# Patient Record
Sex: MALE | Race: BLACK OR AFRICAN AMERICAN | NOT HISPANIC OR LATINO | Employment: FULL TIME | URBAN - METROPOLITAN AREA
[De-identification: names, ages, dates, MRNs, and addresses within clinical notes are randomized per-mention and may not be internally consistent; named-entity substitution may affect disease eponyms.]

---

## 2023-04-03 PROBLEM — M25.562 CHRONIC PAIN OF LEFT KNEE: Status: ACTIVE | Noted: 2023-04-03

## 2023-04-03 PROBLEM — G47.33 OBSTRUCTIVE SLEEP APNEA: Status: ACTIVE | Noted: 2023-04-03

## 2023-04-03 PROBLEM — R09.81 NASAL CONGESTION: Status: ACTIVE | Noted: 2023-04-03

## 2023-04-03 PROBLEM — E66.812 CLASS 2 OBESITY WITH BODY MASS INDEX (BMI) OF 37.0 TO 37.9 IN ADULT: Status: ACTIVE | Noted: 2023-04-03

## 2023-04-03 PROBLEM — M54.41 CHRONIC MIDLINE LOW BACK PAIN WITH RIGHT-SIDED SCIATICA: Status: ACTIVE | Noted: 2023-04-03

## 2023-04-03 PROBLEM — E11.9 DIABETES MELLITUS (MULTI): Status: ACTIVE | Noted: 2023-04-03

## 2023-04-03 PROBLEM — G89.29 CHRONIC MIDLINE LOW BACK PAIN WITH RIGHT-SIDED SCIATICA: Status: ACTIVE | Noted: 2023-04-03

## 2023-04-03 PROBLEM — E66.01 MORBID OBESITY (MULTI): Status: ACTIVE | Noted: 2023-04-03

## 2023-04-03 PROBLEM — M79.642 LEFT HAND PAIN: Status: ACTIVE | Noted: 2023-04-03

## 2023-04-03 PROBLEM — M16.11 ARTHRITIS OF RIGHT HIP: Status: ACTIVE | Noted: 2023-04-03

## 2023-04-03 PROBLEM — N18.30 CKD (CHRONIC KIDNEY DISEASE), STAGE III (MULTI): Status: ACTIVE | Noted: 2023-04-03

## 2023-04-03 PROBLEM — L60.3 NAIL DYSTROPHY: Status: ACTIVE | Noted: 2023-04-03

## 2023-04-03 PROBLEM — M99.9 NONALLOPATHIC LESION OF LUMBAR REGION: Status: ACTIVE | Noted: 2023-04-03

## 2023-04-03 PROBLEM — E78.5 HYPERLIPIDEMIA: Status: ACTIVE | Noted: 2023-04-03

## 2023-04-03 PROBLEM — E66.9 CLASS 2 OBESITY WITH BODY MASS INDEX (BMI) OF 37.0 TO 37.9 IN ADULT: Status: ACTIVE | Noted: 2023-04-03

## 2023-04-03 PROBLEM — R46.89 NON-COMPLIANT BEHAVIOR: Status: ACTIVE | Noted: 2023-04-03

## 2023-04-03 PROBLEM — R06.00 DYSPNEA: Status: ACTIVE | Noted: 2023-04-03

## 2023-04-03 PROBLEM — R07.9 CHEST PAIN: Status: ACTIVE | Noted: 2023-04-03

## 2023-04-03 PROBLEM — R51.9 ACHING HEADACHE: Status: ACTIVE | Noted: 2023-04-03

## 2023-04-03 PROBLEM — E04.9 GOITER: Status: ACTIVE | Noted: 2023-04-03

## 2023-04-03 PROBLEM — E29.1 HYPOGONADISM MALE: Status: ACTIVE | Noted: 2023-04-03

## 2023-04-03 PROBLEM — G56.00 CARPAL TUNNEL SYNDROME: Status: ACTIVE | Noted: 2023-04-03

## 2023-04-03 PROBLEM — E11.9 DIABETES MELLITUS, TYPE 2 (MULTI): Status: ACTIVE | Noted: 2023-04-03

## 2023-04-03 PROBLEM — M25.559 JOINT PAIN, HIP: Status: ACTIVE | Noted: 2023-04-03

## 2023-04-03 PROBLEM — M99.06 SOMATIC DYSFUNCTION OF LOWER EXTREMITIES: Status: ACTIVE | Noted: 2023-04-03

## 2023-04-03 PROBLEM — D12.6 TUBULAR ADENOMA OF COLON: Status: ACTIVE | Noted: 2023-04-03

## 2023-04-03 PROBLEM — M16.10 HIP ARTHRITIS: Status: ACTIVE | Noted: 2023-04-03

## 2023-04-03 PROBLEM — M79.89 SOFT TISSUE MASS: Status: ACTIVE | Noted: 2023-04-03

## 2023-04-03 PROBLEM — R80.9 PROTEINURIA: Status: ACTIVE | Noted: 2023-04-03

## 2023-04-03 PROBLEM — S83.412A SPRAIN OF MEDIAL COLLATERAL LIGAMENT OF LEFT KNEE: Status: ACTIVE | Noted: 2023-04-03

## 2023-04-03 PROBLEM — I10 BENIGN ESSENTIAL HYPERTENSION: Status: ACTIVE | Noted: 2023-04-03

## 2023-04-03 PROBLEM — G89.29 CHRONIC PAIN OF LEFT KNEE: Status: ACTIVE | Noted: 2023-04-03

## 2023-04-03 RX ORDER — ACETAMINOPHEN 500 MG
1-2 TABLET ORAL AS NEEDED
COMMUNITY
Start: 2021-06-21

## 2023-04-03 RX ORDER — METFORMIN HYDROCHLORIDE 500 MG/1
2 TABLET, EXTENDED RELEASE ORAL 2 TIMES DAILY
COMMUNITY
Start: 2017-02-20 | End: 2023-10-25

## 2023-04-03 RX ORDER — LISINOPRIL 40 MG/1
1 TABLET ORAL DAILY
COMMUNITY
Start: 2013-08-08 | End: 2023-04-06

## 2023-04-03 RX ORDER — GLYBURIDE 5 MG/1
2 TABLET ORAL 2 TIMES DAILY
COMMUNITY
Start: 2015-05-06 | End: 2023-07-06

## 2023-04-03 RX ORDER — BLOOD SUGAR DIAGNOSTIC
1 STRIP MISCELLANEOUS 2 TIMES DAILY
COMMUNITY
Start: 2017-02-20 | End: 2024-01-30 | Stop reason: RX

## 2023-04-03 RX ORDER — ATORVASTATIN CALCIUM 40 MG/1
1 TABLET, FILM COATED ORAL DAILY
COMMUNITY
Start: 2014-06-03 | End: 2024-03-13 | Stop reason: SDUPTHER

## 2023-04-03 RX ORDER — DULAGLUTIDE 3 MG/.5ML
INJECTION, SOLUTION SUBCUTANEOUS
COMMUNITY
Start: 2018-01-24 | End: 2023-04-04 | Stop reason: SDUPTHER

## 2023-04-03 RX ORDER — HYDROCHLOROTHIAZIDE 25 MG/1
1 TABLET ORAL DAILY
COMMUNITY
Start: 2021-11-15 | End: 2024-03-13 | Stop reason: SDUPTHER

## 2023-04-04 ENCOUNTER — OFFICE VISIT (OUTPATIENT)
Dept: PRIMARY CARE | Facility: CLINIC | Age: 58
End: 2023-04-04
Payer: COMMERCIAL

## 2023-04-04 VITALS
HEART RATE: 70 BPM | OXYGEN SATURATION: 92 % | SYSTOLIC BLOOD PRESSURE: 137 MMHG | HEIGHT: 66 IN | WEIGHT: 248.2 LBS | TEMPERATURE: 97 F | BODY MASS INDEX: 39.89 KG/M2 | DIASTOLIC BLOOD PRESSURE: 76 MMHG

## 2023-04-04 DIAGNOSIS — E11.9 TYPE 2 DIABETES MELLITUS WITHOUT COMPLICATION, UNSPECIFIED WHETHER LONG TERM INSULIN USE (MULTI): ICD-10-CM

## 2023-04-04 DIAGNOSIS — M54.12 CERVICAL RADICULOPATHY: Primary | ICD-10-CM

## 2023-04-04 PROCEDURE — 3078F DIAST BP <80 MM HG: CPT | Performed by: INTERNAL MEDICINE

## 2023-04-04 PROCEDURE — 99214 OFFICE O/P EST MOD 30 MIN: CPT | Performed by: INTERNAL MEDICINE

## 2023-04-04 PROCEDURE — 4010F ACE/ARB THERAPY RXD/TAKEN: CPT | Performed by: INTERNAL MEDICINE

## 2023-04-04 PROCEDURE — 3052F HG A1C>EQUAL 8.0%<EQUAL 9.0%: CPT | Performed by: INTERNAL MEDICINE

## 2023-04-04 PROCEDURE — 3075F SYST BP GE 130 - 139MM HG: CPT | Performed by: INTERNAL MEDICINE

## 2023-04-04 RX ORDER — ASPIRIN 81 MG/1
TABLET ORAL
COMMUNITY
Start: 2021-12-30

## 2023-04-04 RX ORDER — DULAGLUTIDE 1.5 MG/.5ML
INJECTION, SOLUTION SUBCUTANEOUS
COMMUNITY
Start: 2021-12-13 | End: 2023-04-04 | Stop reason: DRUGHIGH

## 2023-04-04 RX ORDER — PANTOPRAZOLE SODIUM 40 MG/1
TABLET, DELAYED RELEASE ORAL
COMMUNITY
Start: 2021-12-30 | End: 2023-04-04 | Stop reason: ALTCHOICE

## 2023-04-04 RX ORDER — DULAGLUTIDE 3 MG/.5ML
0.5 INJECTION, SOLUTION SUBCUTANEOUS
Qty: 4 ML | Refills: 3 | Status: SHIPPED | OUTPATIENT
Start: 2023-04-04 | End: 2024-01-18 | Stop reason: SDUPTHER

## 2023-04-04 RX ORDER — DULAGLUTIDE 1.5 MG/.5ML
INJECTION, SOLUTION SUBCUTANEOUS
Qty: 4 EACH | Refills: 2 | Status: SHIPPED | OUTPATIENT
Start: 2023-04-04 | End: 2023-04-04 | Stop reason: DRUGHIGH

## 2023-04-04 ASSESSMENT — ENCOUNTER SYMPTOMS
EYES NEGATIVE: 1
CONSTITUTIONAL NEGATIVE: 1
DIZZINESS: 0
HEADACHES: 0
GASTROINTESTINAL NEGATIVE: 1
NUMBNESS: 0
CHEST TIGHTNESS: 0
SHORTNESS OF BREATH: 0
PSYCHIATRIC NEGATIVE: 1
JOINT SWELLING: 0
COUGH: 0
WHEEZING: 0

## 2023-04-04 ASSESSMENT — PATIENT HEALTH QUESTIONNAIRE - PHQ9
SUM OF ALL RESPONSES TO PHQ9 QUESTIONS 1 & 2: 0
1. LITTLE INTEREST OR PLEASURE IN DOING THINGS: NOT AT ALL
2. FEELING DOWN, DEPRESSED OR HOPELESS: NOT AT ALL

## 2023-04-04 ASSESSMENT — LIFESTYLE VARIABLES
AUDIT-C TOTAL SCORE: 2
HOW OFTEN DO YOU HAVE A DRINK CONTAINING ALCOHOL: MONTHLY OR LESS
SKIP TO QUESTIONS 9-10: 0
HOW OFTEN DO YOU HAVE SIX OR MORE DRINKS ON ONE OCCASION: LESS THAN MONTHLY
HOW MANY STANDARD DRINKS CONTAINING ALCOHOL DO YOU HAVE ON A TYPICAL DAY: 1 OR 2

## 2023-04-04 NOTE — PATIENT INSTRUCTIONS
You were seen today for intermittent tingling and feeling of the left upper extremity.  Noted that your symptoms have been occurring for approximately the past 3 months without known injury.  Your examination today did not reveal the presence of any neurological deficits and you denied having any significant pain.  An order for x-rays of your neck was completed today and a referral to  medical spine was also completed for further evaluation and management.  Call or return for evaluation if your symptoms worsen or fail to improve.  It was a pleasure seeing you today.  
Previously Declined (within the last year)

## 2023-04-04 NOTE — PROGRESS NOTES
Subjective   Patient ID: Babak Forte is a 58 y.o. male who presents for Follow-up (Left arm pain/ tingling).  Left arm arm pain and tingling that began appx three months ago. He has been evaluated in the ED, primary care medicine and cardiology. He has had  CT scan and cardiology evaluation but  the left arm tingling persists. He was seen most recently by cardiology on 03/27/2023 and he was told that his symptoms are likely caused by a pinched nerve.  The patient denies chest pain or SOB.        Review of Systems   Constitutional: Negative.    HENT: Negative.     Eyes: Negative.    Respiratory:  Negative for cough, chest tightness, shortness of breath and wheezing.    Cardiovascular:  Negative for chest pain and leg swelling.   Gastrointestinal: Negative.    Musculoskeletal:  Negative for joint swelling.   Neurological:  Negative for dizziness, numbness and headaches.   Psychiatric/Behavioral: Negative.         Objective   Physical Exam  Vitals reviewed.   Constitutional:       General: He is not in acute distress.     Appearance: He is obese. He is not ill-appearing.   HENT:      Head: Normocephalic.   Cardiovascular:      Rate and Rhythm: Normal rate and regular rhythm.      Heart sounds: Normal heart sounds.   Pulmonary:      Effort: Pulmonary effort is normal.      Breath sounds: Normal breath sounds.   Abdominal:      General: Bowel sounds are normal.      Palpations: Abdomen is soft.      Tenderness: There is no abdominal tenderness.   Musculoskeletal:         General: No swelling, tenderness or deformity. Normal range of motion.      Cervical back: Normal range of motion and neck supple. No rigidity or tenderness.   Lymphadenopathy:      Cervical: No cervical adenopathy.   Skin:     General: Skin is warm and dry.   Neurological:      General: No focal deficit present.      Mental Status: He is alert and oriented to person, place, and time.      Sensory: No sensory deficit.      Motor: No weakness.       Gait: Gait normal.   Psychiatric:         Mood and Affect: Mood normal.         Behavior: Behavior normal.         Assessment/Plan   Problem List Items Addressed This Visit          Endocrine/Metabolic    Diabetes mellitus, type 2 (CMS/HCC)    Relevant Medications    dulaglutide (Trulicity) 1.5 mg/0.5 mL pen injector     Other Visit Diagnoses       Cervical radiculopathy    -  Primary    Relevant Orders    XR cervical spine 2-3 views    Referral to Medical Spine    Follow Up In [facility] - PCP

## 2023-04-05 DIAGNOSIS — I10 BENIGN ESSENTIAL HYPERTENSION: Primary | ICD-10-CM

## 2023-04-06 RX ORDER — LISINOPRIL 40 MG/1
TABLET ORAL
Qty: 90 TABLET | Refills: 1 | Status: SHIPPED | OUTPATIENT
Start: 2023-04-06 | End: 2023-11-27

## 2023-05-12 ENCOUNTER — APPOINTMENT (OUTPATIENT)
Dept: PRIMARY CARE | Facility: CLINIC | Age: 58
End: 2023-05-12
Payer: COMMERCIAL

## 2023-07-05 DIAGNOSIS — N18.31 TYPE 2 DIABETES MELLITUS WITH STAGE 3A CHRONIC KIDNEY DISEASE, WITHOUT LONG-TERM CURRENT USE OF INSULIN (MULTI): Primary | ICD-10-CM

## 2023-07-05 DIAGNOSIS — E11.22 TYPE 2 DIABETES MELLITUS WITH STAGE 3A CHRONIC KIDNEY DISEASE, WITHOUT LONG-TERM CURRENT USE OF INSULIN (MULTI): Primary | ICD-10-CM

## 2023-07-06 RX ORDER — GLYBURIDE 5 MG/1
TABLET ORAL
Qty: 360 TABLET | Refills: 2 | Status: SHIPPED | OUTPATIENT
Start: 2023-07-06 | End: 2024-01-30 | Stop reason: ALTCHOICE

## 2023-08-02 ENCOUNTER — APPOINTMENT (OUTPATIENT)
Dept: PRIMARY CARE | Facility: CLINIC | Age: 58
End: 2023-08-02
Payer: COMMERCIAL

## 2023-08-25 ENCOUNTER — OFFICE VISIT (OUTPATIENT)
Dept: PRIMARY CARE | Facility: CLINIC | Age: 58
End: 2023-08-25
Payer: COMMERCIAL

## 2023-08-25 VITALS
BODY MASS INDEX: 38.51 KG/M2 | RESPIRATION RATE: 18 BRPM | SYSTOLIC BLOOD PRESSURE: 122 MMHG | HEART RATE: 75 BPM | OXYGEN SATURATION: 96 % | TEMPERATURE: 97 F | HEIGHT: 66 IN | DIASTOLIC BLOOD PRESSURE: 70 MMHG | WEIGHT: 239.6 LBS

## 2023-08-25 DIAGNOSIS — N18.31 TYPE 2 DIABETES MELLITUS WITH STAGE 3A CHRONIC KIDNEY DISEASE, WITHOUT LONG-TERM CURRENT USE OF INSULIN (MULTI): ICD-10-CM

## 2023-08-25 DIAGNOSIS — E78.5 HYPERLIPIDEMIA, UNSPECIFIED HYPERLIPIDEMIA TYPE: ICD-10-CM

## 2023-08-25 DIAGNOSIS — M54.12 CERVICAL RADICULOPATHY: ICD-10-CM

## 2023-08-25 DIAGNOSIS — I10 BENIGN ESSENTIAL HYPERTENSION: Primary | ICD-10-CM

## 2023-08-25 DIAGNOSIS — E11.22 TYPE 2 DIABETES MELLITUS WITH STAGE 3A CHRONIC KIDNEY DISEASE, WITHOUT LONG-TERM CURRENT USE OF INSULIN (MULTI): ICD-10-CM

## 2023-08-25 PROCEDURE — 3052F HG A1C>EQUAL 8.0%<EQUAL 9.0%: CPT | Performed by: INTERNAL MEDICINE

## 2023-08-25 PROCEDURE — 4010F ACE/ARB THERAPY RXD/TAKEN: CPT | Performed by: INTERNAL MEDICINE

## 2023-08-25 PROCEDURE — 3078F DIAST BP <80 MM HG: CPT | Performed by: INTERNAL MEDICINE

## 2023-08-25 PROCEDURE — 1036F TOBACCO NON-USER: CPT | Performed by: INTERNAL MEDICINE

## 2023-08-25 PROCEDURE — 3074F SYST BP LT 130 MM HG: CPT | Performed by: INTERNAL MEDICINE

## 2023-08-25 PROCEDURE — 99213 OFFICE O/P EST LOW 20 MIN: CPT | Performed by: INTERNAL MEDICINE

## 2023-08-25 ASSESSMENT — PATIENT HEALTH QUESTIONNAIRE - PHQ9
2. FEELING DOWN, DEPRESSED OR HOPELESS: NOT AT ALL
1. LITTLE INTEREST OR PLEASURE IN DOING THINGS: NOT AT ALL
SUM OF ALL RESPONSES TO PHQ9 QUESTIONS 1 & 2: 0

## 2023-08-25 ASSESSMENT — LIFESTYLE VARIABLES
HOW OFTEN DO YOU HAVE SIX OR MORE DRINKS ON ONE OCCASION: NEVER
AUDIT-C TOTAL SCORE: 1
HOW OFTEN DO YOU HAVE A DRINK CONTAINING ALCOHOL: MONTHLY OR LESS
HOW MANY STANDARD DRINKS CONTAINING ALCOHOL DO YOU HAVE ON A TYPICAL DAY: 1 OR 2
SKIP TO QUESTIONS 9-10: 1

## 2023-08-25 ASSESSMENT — ENCOUNTER SYMPTOMS: CONSTITUTIONAL NEGATIVE: 1

## 2023-08-25 NOTE — PATIENT INSTRUCTIONS
Assessment/Plan     Babak was seen today for diabetes.  Diagnoses and all orders for this visit:  Benign essential hypertension (Primary)  Comments:  Controlled with current treatment. BP today is at goal.  Plan: Continue current TX  Orders:  -     Basic Metabolic Panel; Future  Cervical radiculopathy  -     Follow Up In [facility] - PCP  Hyperlipidemia, unspecified hyperlipidemia type  Comments:  Most recent LDL cholesterol was at goal.   Plan: Continue Curent treatment  Orders:  -     Cholesterol, LDL Direct; Future  -     Lipid Panel Non-Fasting; Future  Type 2 diabetes mellitus with stage 3a chronic kidney disease, without long-term current use of insulin (CMS/[facility])  Comments:  Most recent Hba1c was 8.4%. goal is Hba1c<7.0%.  Plan: Chech  Hba1c today  -Continue current medications  -Continue weight reduction  Orders:  -     Basic Metabolic Panel; Future  -     Hemoglobin A1C; Future  F/U in 3 mths for DM and HTN

## 2023-08-25 NOTE — PROGRESS NOTES
Subjective   Patient ID: Babak Forte is a 58 y.o. male who presents for Diabetes.  The patient is a 57 YO male who is being seen today for DM and HTN. He states that his Diabetes control has improved over the past few months.  His most recent Hba1c  completed appx 7 1/2 mths ago was 8.4%.        Review of Systems   Constitutional: Negative.    HENT: Negative.     Eyes: Negative.    Respiratory:  Negative for cough and shortness of breath.    Cardiovascular:  Negative for chest pain and leg swelling.   Endocrine: Negative for polydipsia, polyphagia and polyuria.   Musculoskeletal:  Negative for arthralgias and joint swelling.   Neurological:  Negative for dizziness, numbness and headaches.   Psychiatric/Behavioral: Negative.         Objective   Physical Exam  Vitals reviewed.   Constitutional:       Appearance: Normal appearance.   Cardiovascular:      Rate and Rhythm: Normal rate and regular rhythm.      Heart sounds: Normal heart sounds.   Pulmonary:      Effort: Pulmonary effort is normal.      Breath sounds: Normal breath sounds.   Abdominal:      General: Bowel sounds are normal.      Palpations: Abdomen is soft.      Tenderness: There is no abdominal tenderness.   Musculoskeletal:      Cervical back: Neck supple.      Right lower leg: No edema.      Left lower leg: No edema.   Lymphadenopathy:      Cervical: No cervical adenopathy.   Skin:     General: Skin is warm and dry.   Neurological:      Mental Status: He is alert and oriented to person, place, and time.      Sensory: No sensory deficit.   Psychiatric:         Mood and Affect: Mood normal.         Behavior: Behavior normal.         Assessment/Plan     Babak was seen today for diabetes.  Diagnoses and all orders for this visit:  Benign essential hypertension (Primary)  Comments:  Controlled with current treatment. BP today is at goal.  Plan: Continue current TX  Orders:  -     Basic Metabolic Panel; Future  Cervical radiculopathy  -     Follow Up In  [facility] - PCP  Hyperlipidemia, unspecified hyperlipidemia type  Comments:  Most recent LDL cholesterol was at goal.   Plan: Continue Curent treatment  Orders:  -     Cholesterol, LDL Direct; Future  -     Lipid Panel Non-Fasting; Future  Type 2 diabetes mellitus with stage 3a chronic kidney disease, without long-term current use of insulin (CMS/[facility])  Comments:  Most recent Hba1c was 8.4%. goal is Hba1c<7.0%.  Plan: Chech  Hba1c today  -Continue current medications  -Continue weight reduction  Orders:  -     Basic Metabolic Panel; Future  -     Hemoglobin A1C; Future    F/U in 3 mths for DM and HTN

## 2023-09-05 ASSESSMENT — ENCOUNTER SYMPTOMS
HEADACHES: 0
COUGH: 0
ARTHRALGIAS: 0
SHORTNESS OF BREATH: 0
EYES NEGATIVE: 1
NUMBNESS: 0
POLYPHAGIA: 0
DIZZINESS: 0
JOINT SWELLING: 0
PSYCHIATRIC NEGATIVE: 1
POLYDIPSIA: 0

## 2023-09-16 ENCOUNTER — LAB (OUTPATIENT)
Dept: LAB | Facility: LAB | Age: 58
End: 2023-09-16
Payer: COMMERCIAL

## 2023-09-16 DIAGNOSIS — E78.5 HYPERLIPIDEMIA, UNSPECIFIED HYPERLIPIDEMIA TYPE: ICD-10-CM

## 2023-09-16 DIAGNOSIS — E11.22 TYPE 2 DIABETES MELLITUS WITH STAGE 3A CHRONIC KIDNEY DISEASE, WITHOUT LONG-TERM CURRENT USE OF INSULIN (MULTI): ICD-10-CM

## 2023-09-16 DIAGNOSIS — I10 BENIGN ESSENTIAL HYPERTENSION: ICD-10-CM

## 2023-09-16 DIAGNOSIS — N18.31 TYPE 2 DIABETES MELLITUS WITH STAGE 3A CHRONIC KIDNEY DISEASE, WITHOUT LONG-TERM CURRENT USE OF INSULIN (MULTI): ICD-10-CM

## 2023-09-16 LAB
ANION GAP IN SER/PLAS: 14 MMOL/L (ref 10–20)
CALCIUM (MG/DL) IN SER/PLAS: 9.1 MG/DL (ref 8.6–10.6)
CARBON DIOXIDE, TOTAL (MMOL/L) IN SER/PLAS: 27 MMOL/L (ref 21–32)
CHLORIDE (MMOL/L) IN SER/PLAS: 104 MMOL/L (ref 98–107)
CHOLESTEROL (MG/DL) IN SER/PLAS: 124 MG/DL (ref 0–199)
CHOLESTEROL IN HDL (MG/DL) IN SER/PLAS: 30.9 MG/DL
CHOLESTEROL IN LDL (MG/DL) IN SER/PLAS BY DIRECT ASSAY: 76 MG/DL (ref 0–129)
CHOLESTEROL/HDL RATIO: 4
CREATININE (MG/DL) IN SER/PLAS: 1.69 MG/DL (ref 0.5–1.3)
ESTIMATED AVERAGE GLUCOSE FOR HBA1C: 209 MG/DL
GFR MALE: 46 ML/MIN/1.73M2
GLUCOSE (MG/DL) IN SER/PLAS: 143 MG/DL (ref 74–99)
HEMOGLOBIN A1C/HEMOGLOBIN TOTAL IN BLOOD: 8.9 %
NON-HDL CHOLESTEROL: 93 MG/DL
POTASSIUM (MMOL/L) IN SER/PLAS: 4.4 MMOL/L (ref 3.5–5.3)
SODIUM (MMOL/L) IN SER/PLAS: 141 MMOL/L (ref 136–145)
UREA NITROGEN (MG/DL) IN SER/PLAS: 15 MG/DL (ref 6–23)

## 2023-09-16 PROCEDURE — 80048 BASIC METABOLIC PNL TOTAL CA: CPT

## 2023-09-16 PROCEDURE — 82465 ASSAY BLD/SERUM CHOLESTEROL: CPT

## 2023-09-16 PROCEDURE — 83721 ASSAY OF BLOOD LIPOPROTEIN: CPT

## 2023-09-16 PROCEDURE — 36415 COLL VENOUS BLD VENIPUNCTURE: CPT

## 2023-09-16 PROCEDURE — 83718 ASSAY OF LIPOPROTEIN: CPT

## 2023-09-16 PROCEDURE — 83036 HEMOGLOBIN GLYCOSYLATED A1C: CPT

## 2023-10-23 DIAGNOSIS — N18.31 TYPE 2 DIABETES MELLITUS WITH STAGE 3A CHRONIC KIDNEY DISEASE, WITHOUT LONG-TERM CURRENT USE OF INSULIN (MULTI): Primary | ICD-10-CM

## 2023-10-23 DIAGNOSIS — E11.22 TYPE 2 DIABETES MELLITUS WITH STAGE 3A CHRONIC KIDNEY DISEASE, WITHOUT LONG-TERM CURRENT USE OF INSULIN (MULTI): Primary | ICD-10-CM

## 2023-10-25 RX ORDER — METFORMIN HYDROCHLORIDE 500 MG/1
1000 TABLET, EXTENDED RELEASE ORAL 2 TIMES DAILY
Qty: 360 TABLET | Refills: 0 | Status: SHIPPED | OUTPATIENT
Start: 2023-10-25 | End: 2024-04-19 | Stop reason: SDUPTHER

## 2023-11-16 ENCOUNTER — APPOINTMENT (OUTPATIENT)
Dept: PRIMARY CARE | Facility: CLINIC | Age: 58
End: 2023-11-16
Payer: COMMERCIAL

## 2023-11-24 DIAGNOSIS — I10 BENIGN ESSENTIAL HYPERTENSION: ICD-10-CM

## 2023-11-27 RX ORDER — LISINOPRIL 40 MG/1
40 TABLET ORAL DAILY
Qty: 90 TABLET | Refills: 0 | Status: SHIPPED | OUTPATIENT
Start: 2023-11-27

## 2024-01-18 ENCOUNTER — OFFICE VISIT (OUTPATIENT)
Dept: PRIMARY CARE | Facility: CLINIC | Age: 59
End: 2024-01-18
Payer: COMMERCIAL

## 2024-01-18 VITALS
WEIGHT: 246.5 LBS | OXYGEN SATURATION: 97 % | DIASTOLIC BLOOD PRESSURE: 62 MMHG | SYSTOLIC BLOOD PRESSURE: 130 MMHG | BODY MASS INDEX: 39.62 KG/M2 | TEMPERATURE: 97.6 F | HEIGHT: 66 IN | HEART RATE: 74 BPM | RESPIRATION RATE: 18 BRPM

## 2024-01-18 DIAGNOSIS — I10 BENIGN ESSENTIAL HYPERTENSION: Primary | ICD-10-CM

## 2024-01-18 DIAGNOSIS — E11.22 TYPE 2 DIABETES MELLITUS WITH STAGE 3A CHRONIC KIDNEY DISEASE, WITHOUT LONG-TERM CURRENT USE OF INSULIN (MULTI): ICD-10-CM

## 2024-01-18 DIAGNOSIS — N18.31 TYPE 2 DIABETES MELLITUS WITH STAGE 3A CHRONIC KIDNEY DISEASE, WITHOUT LONG-TERM CURRENT USE OF INSULIN (MULTI): ICD-10-CM

## 2024-01-18 DIAGNOSIS — E11.9 TYPE 2 DIABETES MELLITUS WITHOUT COMPLICATION, UNSPECIFIED WHETHER LONG TERM INSULIN USE (MULTI): ICD-10-CM

## 2024-01-18 PROCEDURE — 99214 OFFICE O/P EST MOD 30 MIN: CPT | Performed by: STUDENT IN AN ORGANIZED HEALTH CARE EDUCATION/TRAINING PROGRAM

## 2024-01-18 PROCEDURE — 3078F DIAST BP <80 MM HG: CPT | Performed by: STUDENT IN AN ORGANIZED HEALTH CARE EDUCATION/TRAINING PROGRAM

## 2024-01-18 PROCEDURE — 1036F TOBACCO NON-USER: CPT | Performed by: STUDENT IN AN ORGANIZED HEALTH CARE EDUCATION/TRAINING PROGRAM

## 2024-01-18 PROCEDURE — 4010F ACE/ARB THERAPY RXD/TAKEN: CPT | Performed by: STUDENT IN AN ORGANIZED HEALTH CARE EDUCATION/TRAINING PROGRAM

## 2024-01-18 PROCEDURE — 3075F SYST BP GE 130 - 139MM HG: CPT | Performed by: STUDENT IN AN ORGANIZED HEALTH CARE EDUCATION/TRAINING PROGRAM

## 2024-01-18 RX ORDER — DULAGLUTIDE 3 MG/.5ML
0.5 INJECTION, SOLUTION SUBCUTANEOUS
Qty: 4 ML | Refills: 3 | Status: SHIPPED | OUTPATIENT
Start: 2024-01-18 | End: 2024-01-30 | Stop reason: DRUGHIGH

## 2024-01-18 ASSESSMENT — LIFESTYLE VARIABLES
HOW OFTEN DO YOU HAVE A DRINK CONTAINING ALCOHOL: MONTHLY OR LESS
AUDIT-C TOTAL SCORE: 1
HOW OFTEN DO YOU HAVE SIX OR MORE DRINKS ON ONE OCCASION: NEVER
HOW MANY STANDARD DRINKS CONTAINING ALCOHOL DO YOU HAVE ON A TYPICAL DAY: 1 OR 2
SKIP TO QUESTIONS 9-10: 1

## 2024-01-18 ASSESSMENT — PATIENT HEALTH QUESTIONNAIRE - PHQ9
1. LITTLE INTEREST OR PLEASURE IN DOING THINGS: NOT AT ALL
2. FEELING DOWN, DEPRESSED OR HOPELESS: NOT AT ALL
SUM OF ALL RESPONSES TO PHQ9 QUESTIONS 1 & 2: 0

## 2024-01-18 ASSESSMENT — ENCOUNTER SYMPTOMS
HYPERTENSION: 1
DIABETIC ASSOCIATED SYMPTOMS: 0

## 2024-01-18 NOTE — PROGRESS NOTES
Subjective   Patient ID: Babak Forte is a pleasant 58 y.o. male who presents for Hypertension and Diabetes.  Hypertension  This is a chronic problem. The problem is controlled. Risk factors for coronary artery disease include obesity, male gender, diabetes mellitus and dyslipidemia. There are no compliance problems.    Diabetes  He presents for his follow-up diabetic visit. He has type 2 diabetes mellitus. His disease course has been worsening. There are no hypoglycemic associated symptoms. There are no diabetic associated symptoms. Risk factors for coronary artery disease include dyslipidemia, male sex, obesity and hypertension.   He reports he is compliant with his medications M-F, however he might miss his medications on the weekends.     Review of Systems   All other systems reviewed and are negative.      Visit Vitals  /62 (BP Location: Right arm, Patient Position: Sitting, BP Cuff Size: Large adult)   Pulse 74   Temp 36.4 °C (97.6 °F)   Resp 18          Objective   Physical Exam  Constitutional:       General: He is not in acute distress.     Appearance: Normal appearance. He is obese.   HENT:      Head: Normocephalic and atraumatic.   Eyes:      General: No scleral icterus.     Conjunctiva/sclera: Conjunctivae normal.   Cardiovascular:      Rate and Rhythm: Normal rate and regular rhythm.      Heart sounds: Normal heart sounds.   Pulmonary:      Effort: Pulmonary effort is normal.      Breath sounds: Normal breath sounds. No wheezing.   Abdominal:      General: Bowel sounds are normal. There is no distension.      Palpations: Abdomen is soft.      Tenderness: There is no abdominal tenderness.   Musculoskeletal:      Cervical back: Neck supple.      Right lower leg: No edema.      Left lower leg: No edema.   Lymphadenopathy:      Cervical: No cervical adenopathy.   Skin:     General: Skin is warm and dry.   Neurological:      General: No focal deficit present.      Mental Status: He is alert and  oriented to person, place, and time.   Psychiatric:         Mood and Affect: Mood normal.         Behavior: Behavior normal.         Assessment/Plan   Problem List Items Addressed This Visit       Benign essential hypertension - Primary    Relevant Orders    Comprehensive Metabolic Panel    CBC and Auto Differential    Diabetes mellitus (CMS/[facility])    Relevant Medications    dulaglutide (Trulicity) 3 mg/0.5 mL pen injector    Other Relevant Orders    Follow Up In [facility] - Pharmacy    Hemoglobin A1C    Lipid Panel    Follow Up In [facility] - Pharmacy    Hemoglobin A1C    Lipid Panel    Type 2 diabetes mellitus with stage 3a chronic kidney disease, without long-term current use of insulin (CMS/[facility])     Last HbA1c 8.9% with EGFR of 46.  Repeat blood work, pending.         Relevant Medications    dulaglutide (Trulicity) 3 mg/0.5 mL pen injector    Other Relevant Orders    Follow Up In [facility] - Pharmacy    Hemoglobin A1C    Lipid Panel

## 2024-01-18 NOTE — PATIENT INSTRUCTIONS
Referral to our clinical pharmacy team   Continue with current medications.  Blood work before your next visit.  If you receive medical information from [facility], your results will be released into your online chart. This means you may view or see results before someone from our office contact you directly.  Please keep in mind that if blood work or imaging were ordered during your visit, all the nonurgent lab results will be discussed with you at your next office visit.  Please arrive 15 minutes before your appointment.   Return to office in 3 months for HTN and Diabetes or as needed

## 2024-01-29 ENCOUNTER — APPOINTMENT (OUTPATIENT)
Dept: RADIOLOGY | Facility: HOSPITAL | Age: 59
End: 2024-01-29
Payer: COMMERCIAL

## 2024-01-29 ENCOUNTER — HOSPITAL ENCOUNTER (EMERGENCY)
Facility: HOSPITAL | Age: 59
Discharge: HOME | End: 2024-01-29
Payer: COMMERCIAL

## 2024-01-29 VITALS
BODY MASS INDEX: 38.45 KG/M2 | HEIGHT: 67 IN | WEIGHT: 245 LBS | HEART RATE: 92 BPM | TEMPERATURE: 97 F | OXYGEN SATURATION: 97 % | RESPIRATION RATE: 18 BRPM

## 2024-01-29 DIAGNOSIS — M25.551 RIGHT HIP PAIN: Primary | ICD-10-CM

## 2024-01-29 PROCEDURE — 73502 X-RAY EXAM HIP UNI 2-3 VIEWS: CPT | Mod: RIGHT SIDE | Performed by: RADIOLOGY

## 2024-01-29 PROCEDURE — 73502 X-RAY EXAM HIP UNI 2-3 VIEWS: CPT | Mod: RT

## 2024-01-29 PROCEDURE — 99283 EMERGENCY DEPT VISIT LOW MDM: CPT

## 2024-01-29 RX ORDER — CYCLOBENZAPRINE HCL 10 MG
5 TABLET ORAL
Qty: 7 TABLET | Refills: 0 | Status: SHIPPED | OUTPATIENT
Start: 2024-01-29 | End: 2024-01-30 | Stop reason: WASHOUT

## 2024-01-29 RX ORDER — NAPROXEN 500 MG/1
500 TABLET ORAL
Qty: 14 TABLET | Refills: 0 | Status: SHIPPED | OUTPATIENT
Start: 2024-01-29 | End: 2024-01-30 | Stop reason: WASHOUT

## 2024-01-29 ASSESSMENT — COLUMBIA-SUICIDE SEVERITY RATING SCALE - C-SSRS
6. HAVE YOU EVER DONE ANYTHING, STARTED TO DO ANYTHING, OR PREPARED TO DO ANYTHING TO END YOUR LIFE?: NO
2. HAVE YOU ACTUALLY HAD ANY THOUGHTS OF KILLING YOURSELF?: NO
1. IN THE PAST MONTH, HAVE YOU WISHED YOU WERE DEAD OR WISHED YOU COULD GO TO SLEEP AND NOT WAKE UP?: NO

## 2024-01-29 ASSESSMENT — PAIN - FUNCTIONAL ASSESSMENT: PAIN_FUNCTIONAL_ASSESSMENT: 0-10

## 2024-01-29 ASSESSMENT — PAIN DESCRIPTION - ORIENTATION: ORIENTATION: RIGHT

## 2024-01-29 ASSESSMENT — PAIN DESCRIPTION - LOCATION: LOCATION: HIP

## 2024-01-29 NOTE — ED TRIAGE NOTES
Patient ambulatory to ED with complaint of intermittent R hip pain since Friday. Denies known injury/trauma/fall. Denies any pain currently but states he was unable to ambulate earlier. Has tried Tylenol with no relief. Hx of replacement approx 2 years ago.

## 2024-01-30 ENCOUNTER — TELEMEDICINE (OUTPATIENT)
Dept: PHARMACY | Facility: HOSPITAL | Age: 59
End: 2024-01-30
Payer: COMMERCIAL

## 2024-01-30 ENCOUNTER — TELEPHONE (OUTPATIENT)
Dept: PHARMACY | Facility: HOSPITAL | Age: 59
End: 2024-01-30

## 2024-01-30 DIAGNOSIS — E11.22 TYPE 2 DIABETES MELLITUS WITH STAGE 3A CHRONIC KIDNEY DISEASE, WITHOUT LONG-TERM CURRENT USE OF INSULIN (MULTI): Primary | ICD-10-CM

## 2024-01-30 DIAGNOSIS — N18.31 TYPE 2 DIABETES MELLITUS WITH STAGE 3A CHRONIC KIDNEY DISEASE, WITHOUT LONG-TERM CURRENT USE OF INSULIN (MULTI): Primary | ICD-10-CM

## 2024-01-30 RX ORDER — DAPAGLIFLOZIN 5 MG/1
5 TABLET, FILM COATED ORAL EVERY MORNING
Qty: 30 TABLET | Refills: 1 | Status: SHIPPED | OUTPATIENT
Start: 2024-01-30 | End: 2024-05-17 | Stop reason: DRUGHIGH

## 2024-01-30 RX ORDER — DAPAGLIFLOZIN 5 MG/1
5 TABLET, FILM COATED ORAL DAILY
Qty: 30 TABLET | Refills: 1 | Status: SHIPPED | OUTPATIENT
Start: 2024-01-30 | End: 2024-01-30 | Stop reason: SDUPTHER

## 2024-01-30 RX ORDER — DEXTROSE 4 G
TABLET,CHEWABLE ORAL
Qty: 1 EACH | Refills: 0 | Status: SHIPPED | OUTPATIENT
Start: 2024-01-30 | End: 2024-03-19 | Stop reason: SDUPTHER

## 2024-01-30 RX ORDER — DULAGLUTIDE 4.5 MG/.5ML
4.5 INJECTION, SOLUTION SUBCUTANEOUS
Qty: 2 ML | Refills: 1 | Status: SHIPPED | OUTPATIENT
Start: 2024-01-30 | End: 2024-05-24 | Stop reason: SINTOL

## 2024-01-30 RX ORDER — DAPAGLIFLOZIN 5 MG/1
5 TABLET, FILM COATED ORAL EVERY MORNING
Qty: 30 TABLET | Refills: 1 | Status: SHIPPED | OUTPATIENT
Start: 2024-01-30 | End: 2024-01-30 | Stop reason: SDUPTHER

## 2024-01-30 NOTE — ASSESSMENT & PLAN NOTE
Current Assessment:    Patient does not check his blood glucose level  Patient does not have a glucometer and using his wife's Glucometer  Usually eats 1-2 meals per day ( first meal at 2-3 pm) before work  Meal consists mostly of chicken with rice (mainly) white and sometimes beef.  Patient gets fruit snacks such as apples or grapes and other fruits.  Drinks water and rarely coffee and pops  Currently doing 10,000 steps per da    Plan:    To increase number of meals 3-4 times a day  Reduce meal portion size, replace grapes with other fruits due to sugar content and also replace all white rice / bread / pasta with whole wheat brown ones instead.  To check his blood glucose and record it for next visit twice a day for 2-3 days to check the trend  Send a prescription for Glucometer if covered by his insurance  Send a Prescription to start Farxiga 5 mg for his glycemic control , preserve his kidney function. I will also send and include Farxiga saving card details by email and on the prescripton as well  Stop the Glyburide 5 mg due to decrease in kidney function and it is not recommended for altered kidney function  Increase the dose of Trulicity to 4.5 mg / 0.5 mL

## 2024-01-30 NOTE — PROGRESS NOTES
Subjective     Patient ID: Babak Forte is a 58 y.o. male who presents for Diabetes.    Referring Provider: Ashley Turcios MD     Diabetes  He presents for his initial diabetic visit. He has type 2 diabetes mellitus. There are no hypoglycemic associated symptoms. There are no hypoglycemic complications. Risk factors for coronary artery disease include diabetes mellitus, dyslipidemia, male sex, obesity and hypertension. An ACE inhibitor/angiotensin II receptor blocker is being taken.       No Known Allergies    Objective     Current DM Pharmacotherapy:   Dulaglutide 3mg/0.5mL: Inject 0.5 mL under the skin 1 (one) time per week.   Metformin  mg: TAKE TWO TABLETS BY MOUTH TWO TIMES A DAY   Glyburide 5 mg: TAKE TWO TABLETS BY MOUTH TWO TIMES A DAY     SECONDARY PREVENTION  - Statin? Yes  - ACE-I/ARB? Yes  - Aspirin? Yes    Current monitoring regimen:   Patient is using: glucometer    Testing frequency: N/A    SMBG Readings: N/A ( Will provide the readings next visit)    Any episodes of hypoglycemia? Yes  Hypoglycemia awareness? No      Pertinent PMH Review:  - PMH of Pancreatitis: No  - PMH/FH of Medullary Thyroid Cancer: No  - PMH of Retinopathy: No  - PMH of Urinary Tract Infections: No    Lab Review  Lab Results   Component Value Date    BILITOT 0.6 01/15/2023    CALCIUM 9.1 09/16/2023    CO2 27 09/16/2023     09/16/2023    CREATININE 1.69 (H) 09/16/2023    GLUCOSE 143 (H) 09/16/2023    ALKPHOS 63 01/15/2023    K 4.4 09/16/2023    PROT 8.1 01/15/2023     09/16/2023    AST 32 01/15/2023    ALT 53 (H) 01/15/2023    BUN 15 09/16/2023    ANIONGAP 14 09/16/2023    MG 1.6 12/30/2021    PHOS 2.9 12/30/2021    ALBUMIN 4.9 01/15/2023    GFRMALE 46 (A) 09/16/2023     Lab Results   Component Value Date    TRIG 305 (H) 01/09/2023    CHOL 124 09/16/2023    HDL 30.9 (A) 09/16/2023     Lab Results   Component Value Date    HGBA1C 8.9 (A) 09/16/2023     The ASCVD Risk score (Susy DK, et al., 2019) failed to  calculate for the following reasons:    The valid total cholesterol range is 130 to 320 mg/dL      Assessment/Plan     Problem List Items Addressed This Visit       Type 2 diabetes mellitus with stage 3a chronic kidney disease, without long-term current use of insulin (CMS/[facility])     Current Assessment:    Patient does not check his blood glucose level  Patient does not have a glucometer and using his wife's Glucometer  Usually eats 1-2 meals per day ( first meal at 2-3 pm) before work  Meal consists mostly of chicken with rice (mainly) white and sometimes beef.  Patient gets fruit snacks such as apples or grapes and other fruits.  Drinks water and rarely coffee and pops  Currently doing 10,000 steps per da    Plan:    To increase number of meals 3-4 times a day  Reduce meal portion size, replace grapes with other fruits due to sugar content and also replace all white rice / bread / pasta with whole wheat brown ones instead.  To check his blood glucose and record it for next visit twice a day for 2-3 days to check the trend  Send a prescription for Glucometer if covered by his insurance  Send a Prescription to start Farxiga 5 mg for his glycemic control , preserve his kidney function. I will also send and include Farxiga saving card details by email and on the prescripton as well.  Stop the Glyburide 5 mg due to decrease in kidney function and it is not recommended for altered kidney function  Increase the dose of Trulicity to 4.5 mg / 0.5 mL            Type 2 diabetes mellitus, is not at goal. Goal A1C: <7%    Follow up: I recommend diabetes care be 5 weeks.    YUNG Gunn.Sc, BCPS, BCMTMS, [facility]  PGY1 Pharmacy Resident  [phone]    Continue all meds under the continuation of care with the referring provider and clinical pharmacy team

## 2024-01-30 NOTE — ED PROVIDER NOTES
HPI   Chief Complaint   Patient presents with    Hip Pain       58-year-old male presents today with acute right hip pain.  The pain is now 0 out of 10 but it was 6-8 out of 10 just 2 hours ago.  It started irritated patient when he tried to ambulate.  He has a history of prosthetic hip that was replaced by Dr. Villa.  He was concerned that he might have injured the prosthetic appliance.  He denies change in skin temperature or color.  He denies fever, headache, chest pain, dyspnea, abdominal pain, nausea or vomiting.  He denies paresthesia.  He denies any other concerning symptoms.      History provided by:  Patient   used: No                        Olivier Coma Scale Score: 15                  Patient History   Past Medical History:   Diagnosis Date    Personal history of other diseases of the circulatory system     History of diastolic dysfunction     Past Surgical History:   Procedure Laterality Date    ANKLE SURGERY  08/23/2013    Ankle Surgery    COLONOSCOPY  01/04/2016    Complete Colonoscopy     Family History   Problem Relation Name Age of Onset    Diabetes Mother      Stroke Father      Hypertension Father      Hypertension Other Family History      Social History     Tobacco Use    Smoking status: Never    Smokeless tobacco: Never   Substance Use Topics    Alcohol use: Not Currently     Alcohol/week: 1.0 standard drink of alcohol     Types: 1 Shots of liquor per week    Drug use: Never       Physical Exam   ED Triage Vitals [01/29/24 1822]   Temperature Heart Rate Respirations BP   36.1 °C (97 °F) 92 18 --      Pulse Ox Temp src Heart Rate Source Patient Position   97 % -- -- --      BP Location FiO2 (%)     -- --       Physical Exam  Constitutional:       Appearance: Normal appearance.   HENT:      Head: Normocephalic and atraumatic.      Right Ear: Tympanic membrane normal.      Left Ear: Tympanic membrane normal.      Nose: Nose normal.      Mouth/Throat:      Mouth: Mucous  membranes are dry.   Eyes:      Pupils: Pupils are equal, round, and reactive to light.   Cardiovascular:      Rate and Rhythm: Normal rate and regular rhythm.      Pulses: Normal pulses.      Heart sounds: Normal heart sounds.   Pulmonary:      Effort: Pulmonary effort is normal.      Breath sounds: Normal breath sounds.   Abdominal:      General: Abdomen is flat.      Palpations: Abdomen is soft.   Genitourinary:     Penis: Normal.       Testes: Normal.   Musculoskeletal:         General: Normal range of motion.      Cervical back: Normal range of motion.   Skin:     General: Skin is warm.      Capillary Refill: Capillary refill takes less than 2 seconds.   Neurological:      General: No focal deficit present.      Mental Status: He is alert and oriented to person, place, and time.         ED Course & MDM   Diagnoses as of 01/29/24 2025   Right hip pain       Medical Decision Making  X-ray of hip showed hardware in place.  There was no dislocation or fracture.  I wrote to on-call orthopedic surgery Dr. Rowe, and I copied the original surgeon on the prosthetic hip Dr. Villa.  Both were in agreement the patient can safely be discharged home and follow with Dr. Villa outpatient.  Patient will use Naprosyn and low-dose 5 mg cyclobenzaprine for pain management.  He will also use Voltaren cream 3 times a day.  He is not on any anticoagulant medication and there is no history of kidney failure.  There was no change in skin temperature or color.  Patient was ambulating under his own strength and he can go from a sitting to standing position without difficulty.  Safely discharged home with the understanding of follow-up with Ortho and careful return precautions.    Amount and/or Complexity of Data Reviewed  Radiology: ordered and independent interpretation performed.        Procedure  Procedures     PALOMO Veras-LILIANA  01/29/24 2025

## 2024-02-01 ENCOUNTER — LAB (OUTPATIENT)
Dept: LAB | Facility: LAB | Age: 59
End: 2024-02-01
Payer: COMMERCIAL

## 2024-02-01 DIAGNOSIS — E11.9 TYPE 2 DIABETES MELLITUS WITHOUT COMPLICATION, UNSPECIFIED WHETHER LONG TERM INSULIN USE (MULTI): ICD-10-CM

## 2024-02-01 DIAGNOSIS — N18.31 TYPE 2 DIABETES MELLITUS WITH STAGE 3A CHRONIC KIDNEY DISEASE, WITHOUT LONG-TERM CURRENT USE OF INSULIN (MULTI): ICD-10-CM

## 2024-02-01 DIAGNOSIS — I10 BENIGN ESSENTIAL HYPERTENSION: ICD-10-CM

## 2024-02-01 DIAGNOSIS — E11.22 TYPE 2 DIABETES MELLITUS WITH STAGE 3A CHRONIC KIDNEY DISEASE, WITHOUT LONG-TERM CURRENT USE OF INSULIN (MULTI): ICD-10-CM

## 2024-02-01 LAB
ALBUMIN SERPL BCP-MCNC: 4.4 G/DL (ref 3.4–5)
ALP SERPL-CCNC: 62 U/L (ref 33–120)
ALT SERPL W P-5'-P-CCNC: 51 U/L (ref 10–52)
ANION GAP SERPL CALC-SCNC: 16 MMOL/L (ref 10–20)
AST SERPL W P-5'-P-CCNC: 44 U/L (ref 9–39)
BASOPHILS # BLD AUTO: 0.04 X10*3/UL (ref 0–0.1)
BASOPHILS NFR BLD AUTO: 0.6 %
BILIRUB SERPL-MCNC: 0.7 MG/DL (ref 0–1.2)
BUN SERPL-MCNC: 20 MG/DL (ref 6–23)
CALCIUM SERPL-MCNC: 9.6 MG/DL (ref 8.6–10.6)
CHLORIDE SERPL-SCNC: 102 MMOL/L (ref 98–107)
CHOLEST SERPL-MCNC: 141 MG/DL (ref 0–199)
CHOLESTEROL/HDL RATIO: 5.1
CO2 SERPL-SCNC: 25 MMOL/L (ref 21–32)
CREAT SERPL-MCNC: 1.82 MG/DL (ref 0.5–1.3)
EGFRCR SERPLBLD CKD-EPI 2021: 42 ML/MIN/1.73M*2
EOSINOPHIL # BLD AUTO: 0.17 X10*3/UL (ref 0–0.7)
EOSINOPHIL NFR BLD AUTO: 2.5 %
ERYTHROCYTE [DISTWIDTH] IN BLOOD BY AUTOMATED COUNT: 12.7 % (ref 11.5–14.5)
EST. AVERAGE GLUCOSE BLD GHB EST-MCNC: 209 MG/DL
GLUCOSE SERPL-MCNC: 178 MG/DL (ref 74–99)
HBA1C MFR BLD: 8.9 %
HCT VFR BLD AUTO: 43.5 % (ref 41–52)
HDLC SERPL-MCNC: 27.7 MG/DL
HGB BLD-MCNC: 14.5 G/DL (ref 13.5–17.5)
IMM GRANULOCYTES # BLD AUTO: 0.03 X10*3/UL (ref 0–0.7)
IMM GRANULOCYTES NFR BLD AUTO: 0.4 % (ref 0–0.9)
LDLC SERPL CALC-MCNC: 56 MG/DL
LYMPHOCYTES # BLD AUTO: 1.19 X10*3/UL (ref 1.2–4.8)
LYMPHOCYTES NFR BLD AUTO: 17.7 %
MCH RBC QN AUTO: 30.5 PG (ref 26–34)
MCHC RBC AUTO-ENTMCNC: 33.3 G/DL (ref 32–36)
MCV RBC AUTO: 92 FL (ref 80–100)
MONOCYTES # BLD AUTO: 0.62 X10*3/UL (ref 0.1–1)
MONOCYTES NFR BLD AUTO: 9.2 %
NEUTROPHILS # BLD AUTO: 4.68 X10*3/UL (ref 1.2–7.7)
NEUTROPHILS NFR BLD AUTO: 69.6 %
NON HDL CHOLESTEROL: 113 MG/DL (ref 0–149)
NRBC BLD-RTO: 0 /100 WBCS (ref 0–0)
PLATELET # BLD AUTO: 228 X10*3/UL (ref 150–450)
POTASSIUM SERPL-SCNC: 5.1 MMOL/L (ref 3.5–5.3)
PROT SERPL-MCNC: 7.4 G/DL (ref 6.4–8.2)
RBC # BLD AUTO: 4.75 X10*6/UL (ref 4.5–5.9)
SODIUM SERPL-SCNC: 138 MMOL/L (ref 136–145)
TRIGL SERPL-MCNC: 285 MG/DL (ref 0–149)
VLDL: 57 MG/DL (ref 0–40)
WBC # BLD AUTO: 6.7 X10*3/UL (ref 4.4–11.3)

## 2024-02-01 PROCEDURE — 36415 COLL VENOUS BLD VENIPUNCTURE: CPT

## 2024-02-01 PROCEDURE — 80061 LIPID PANEL: CPT

## 2024-02-01 PROCEDURE — 80053 COMPREHEN METABOLIC PANEL: CPT

## 2024-02-01 PROCEDURE — 83036 HEMOGLOBIN GLYCOSYLATED A1C: CPT

## 2024-02-01 PROCEDURE — 85025 COMPLETE CBC W/AUTO DIFF WBC: CPT

## 2024-02-29 ENCOUNTER — APPOINTMENT (OUTPATIENT)
Dept: ORTHOPEDIC SURGERY | Facility: CLINIC | Age: 59
End: 2024-02-29
Payer: COMMERCIAL

## 2024-03-13 DIAGNOSIS — I10 BENIGN ESSENTIAL HYPERTENSION: ICD-10-CM

## 2024-03-13 DIAGNOSIS — E78.5 HYPERLIPIDEMIA, UNSPECIFIED HYPERLIPIDEMIA TYPE: Primary | ICD-10-CM

## 2024-03-13 RX ORDER — ATORVASTATIN CALCIUM 40 MG/1
40 TABLET, FILM COATED ORAL DAILY
Qty: 90 TABLET | Refills: 2 | Status: SHIPPED | OUTPATIENT
Start: 2024-03-13

## 2024-03-13 RX ORDER — HYDROCHLOROTHIAZIDE 25 MG/1
25 TABLET ORAL DAILY
Qty: 90 TABLET | Refills: 2 | Status: SHIPPED | OUTPATIENT
Start: 2024-03-13

## 2024-03-19 ENCOUNTER — TELEMEDICINE (OUTPATIENT)
Dept: PHARMACY | Facility: HOSPITAL | Age: 59
End: 2024-03-19
Payer: COMMERCIAL

## 2024-03-19 DIAGNOSIS — N18.31 TYPE 2 DIABETES MELLITUS WITH STAGE 3A CHRONIC KIDNEY DISEASE, WITHOUT LONG-TERM CURRENT USE OF INSULIN (MULTI): ICD-10-CM

## 2024-03-19 DIAGNOSIS — E11.22 TYPE 2 DIABETES MELLITUS WITH STAGE 3A CHRONIC KIDNEY DISEASE, WITHOUT LONG-TERM CURRENT USE OF INSULIN (MULTI): ICD-10-CM

## 2024-03-19 RX ORDER — DULAGLUTIDE 4.5 MG/.5ML
4.5 INJECTION, SOLUTION SUBCUTANEOUS
Qty: 2 ML | Refills: 3 | Status: SHIPPED | OUTPATIENT
Start: 2024-04-16 | End: 2024-05-13 | Stop reason: SDUPTHER

## 2024-03-19 RX ORDER — DAPAGLIFLOZIN 10 MG/1
10 TABLET, FILM COATED ORAL EVERY MORNING
Qty: 30 TABLET | Refills: 3 | Status: SHIPPED | OUTPATIENT
Start: 2024-03-19

## 2024-03-19 NOTE — ASSESSMENT & PLAN NOTE
Current Assessment:    Patient does not check his blood glucose level regularly  Patient does not have a glucometer and using his wife's Glucometer  Usually eats 1-2 meals per day ( first meal at 2-3 pm) before work  Patient is cutting on his rice portion  Patient gets fruit snacks such as oranges / apples and stopped eating grapes  Drinks water and rarely coffee and pops  Currently doing 10,000 steps per day  Patient was out of town for work for 2 weeks and missed to take his Trulicity dose and his Blood sugar level was high ranging from 300-400 mg / dL. When he got the medication and used it , his number went back to normal 190-220 mg / dL    Plan:    To increase number of meals 3-4 times a day  Continue on reducing meal portion size  To check his blood glucose and record it for next visit twice a day for 2-3 days to check the trend  Check with morenita zayas about his testing supplies status and if they can't fill it , to transfer to [facility] as they might be able to fill it under insurance  Send a Prescription for Farxiga 10 mg as patient is tolerating the 5 mg well and so instructed the patient to take 2 tablets of the 5 mg he has until he receives the 10 mg, and to take it once he wakes up  Continue using Trulicity to 4.5 mg / 0.5 mL and a new prescription is sent as a refill

## 2024-03-19 NOTE — PROGRESS NOTES
Subjective     Patient ID: Babak Forte is a 58 y.o. male who presents for Diabetes.    Referring Provider: Ashley Turcios MD     Diabetes  He presents for his initial diabetic visit. He has type 2 diabetes mellitus. There are no hypoglycemic associated symptoms. There are no hypoglycemic complications. Risk factors for coronary artery disease include diabetes mellitus, dyslipidemia, male sex, obesity and hypertension. An ACE inhibitor/angiotensin II receptor blocker is being taken.       No Known Allergies    Objective     Current DM Pharmacotherapy:   Dulaglutide 4.5 mg/0.5 mL: Inject 0.5 mL under the skin 1 (one) time per week.   Metformin  mg: TAKE TWO TABLETS BY MOUTH TWO TIMES A DAY   Farxiga 5 mg: Take 1 tablet (5 mg) by mouth once daily in the morning.     SECONDARY PREVENTION  - Statin? Yes  - ACE-I/ARB? Yes  - Aspirin? Yes    Current monitoring regimen:   Patient is using: glucometer    Testing frequency: once a day    SMBG Readings:  - 180 mg / dL    Any episodes of hypoglycemia? Yes  Hypoglycemia awareness? No      Pertinent PMH Review:  - PMH of Pancreatitis: No  - PMH/FH of Medullary Thyroid Cancer: No  - PMH of Retinopathy: No  - PMH of Urinary Tract Infections: No    Lab Review  Lab Results   Component Value Date    BILITOT 0.7 02/01/2024    CALCIUM 9.6 02/01/2024    CO2 25 02/01/2024     02/01/2024    CREATININE 1.82 (H) 02/01/2024    GLUCOSE 178 (H) 02/01/2024    ALKPHOS 62 02/01/2024    K 5.1 02/01/2024    PROT 7.4 02/01/2024     02/01/2024    AST 44 (H) 02/01/2024    ALT 51 02/01/2024    BUN 20 02/01/2024    ANIONGAP 16 02/01/2024    MG 1.6 12/30/2021    PHOS 2.9 12/30/2021    ALBUMIN 4.4 02/01/2024    GFRMALE 46 (A) 09/16/2023     Lab Results   Component Value Date    TRIG 285 (H) 02/01/2024    CHOL 141 02/01/2024    LDLCALC 56 02/01/2024    HDL 27.7 02/01/2024     Lab Results   Component Value Date    HGBA1C 8.9 (H) 02/01/2024     The 10-year ASCVD risk score Ronit  DYLAN, et al., 2019) is: 25.4%    Values used to calculate the score:      Age: 58 years      Sex: Male      Is Non- : Yes      Diabetic: Yes      Tobacco smoker: No      Systolic Blood Pressure: 130 mmHg      Is BP treated: Yes      HDL Cholesterol: 27.7 mg/dL      Total Cholesterol: 141 mg/dL      Assessment/Plan     Problem List Items Addressed This Visit       Type 2 diabetes mellitus with stage 3a chronic kidney disease, without long-term current use of insulin (CMS/[facility])     Current Assessment:    Patient does not check his blood glucose level regularly  Patient does not have a glucometer and using his wife's Glucometer  Usually eats 1-2 meals per day ( first meal at 2-3 pm) before work  Patient is cutting on his rice portion  Patient gets fruit snacks such as oranges / apples and stopped eating grapes  Drinks water and rarely coffee and pops  Currently doing 10,000 steps per day  Patient was out of town for work for 2 weeks and missed to take his Trulicity dose and his Blood sugar level was high ranging from 300-400 mg / dL. When he got the medication and used it , his number went back to normal 190-220 mg / dL    Plan:    To increase number of meals 3-4 times a day  Continue on reducing meal portion size  To check his blood glucose and record it for next visit twice a day for 2-3 days to check the trend  Check with morenita zayas about his testing supplies status and if they can't fill it , to transfer to [facility] as they might be able to fill it under insurance  Send a Prescription for Farxiga 10 mg as patient is tolerating the 5 mg well and so instructed the patient to take 2 tablets of the 5 mg he has until he receives the 10 mg, and to take it once he wakes up  Continue using Trulicity to 4.5 mg / 0.5 mL and a new prescription is sent as a refill         Relevant Orders    Follow Up In [facility] - Pharmacy       Type 2 diabetes mellitus, is not at goal 8.9 %. Goal A1C:  <7%    Follow up: I recommend diabetes care be 5 weeks.    LITO Gunn, BCPS, [facility], [facility]  PGY1 Pharmacy Resident  [phone]    Continue all meds under the continuation of care with the referring provider and clinical pharmacy team

## 2024-03-21 PROCEDURE — RXMED WILLOW AMBULATORY MEDICATION CHARGE

## 2024-03-21 RX ORDER — ISOPROPYL ALCOHOL 70 ML/100ML
SWAB TOPICAL
Qty: 100 EACH | Refills: 1 | Status: SHIPPED | OUTPATIENT
Start: 2024-03-21

## 2024-03-21 RX ORDER — DEXTROSE 4 G
TABLET,CHEWABLE ORAL
Qty: 1 EACH | Refills: 0 | Status: SHIPPED | OUTPATIENT
Start: 2024-03-21

## 2024-03-21 RX ORDER — LANCETS
EACH MISCELLANEOUS
Qty: 100 EACH | Refills: 1 | Status: SHIPPED | OUTPATIENT
Start: 2024-03-21

## 2024-03-22 ENCOUNTER — PHARMACY VISIT (OUTPATIENT)
Dept: PHARMACY | Facility: CLINIC | Age: 59
End: 2024-03-22
Payer: COMMERCIAL

## 2024-04-19 ENCOUNTER — OFFICE VISIT (OUTPATIENT)
Dept: PRIMARY CARE | Facility: CLINIC | Age: 59
End: 2024-04-19
Payer: COMMERCIAL

## 2024-04-19 VITALS
WEIGHT: 237.8 LBS | DIASTOLIC BLOOD PRESSURE: 60 MMHG | SYSTOLIC BLOOD PRESSURE: 122 MMHG | HEART RATE: 89 BPM | TEMPERATURE: 97.8 F | HEIGHT: 67 IN | BODY MASS INDEX: 37.32 KG/M2 | OXYGEN SATURATION: 97 % | RESPIRATION RATE: 12 BRPM

## 2024-04-19 DIAGNOSIS — N18.31 TYPE 2 DIABETES MELLITUS WITH STAGE 3A CHRONIC KIDNEY DISEASE, WITHOUT LONG-TERM CURRENT USE OF INSULIN (MULTI): Primary | ICD-10-CM

## 2024-04-19 DIAGNOSIS — Z12.5 PROSTATE CANCER SCREENING: ICD-10-CM

## 2024-04-19 DIAGNOSIS — E11.22 TYPE 2 DIABETES MELLITUS WITH STAGE 3A CHRONIC KIDNEY DISEASE, WITHOUT LONG-TERM CURRENT USE OF INSULIN (MULTI): Primary | ICD-10-CM

## 2024-04-19 DIAGNOSIS — I10 BENIGN ESSENTIAL HYPERTENSION: ICD-10-CM

## 2024-04-19 PROCEDURE — 99214 OFFICE O/P EST MOD 30 MIN: CPT | Performed by: STUDENT IN AN ORGANIZED HEALTH CARE EDUCATION/TRAINING PROGRAM

## 2024-04-19 PROCEDURE — 1036F TOBACCO NON-USER: CPT | Performed by: STUDENT IN AN ORGANIZED HEALTH CARE EDUCATION/TRAINING PROGRAM

## 2024-04-19 PROCEDURE — 3078F DIAST BP <80 MM HG: CPT | Performed by: STUDENT IN AN ORGANIZED HEALTH CARE EDUCATION/TRAINING PROGRAM

## 2024-04-19 PROCEDURE — 3048F LDL-C <100 MG/DL: CPT | Performed by: STUDENT IN AN ORGANIZED HEALTH CARE EDUCATION/TRAINING PROGRAM

## 2024-04-19 PROCEDURE — 3074F SYST BP LT 130 MM HG: CPT | Performed by: STUDENT IN AN ORGANIZED HEALTH CARE EDUCATION/TRAINING PROGRAM

## 2024-04-19 PROCEDURE — 4010F ACE/ARB THERAPY RXD/TAKEN: CPT | Performed by: STUDENT IN AN ORGANIZED HEALTH CARE EDUCATION/TRAINING PROGRAM

## 2024-04-19 PROCEDURE — 3052F HG A1C>EQUAL 8.0%<EQUAL 9.0%: CPT | Performed by: STUDENT IN AN ORGANIZED HEALTH CARE EDUCATION/TRAINING PROGRAM

## 2024-04-19 RX ORDER — METFORMIN HYDROCHLORIDE 1000 MG/1
1000 TABLET, FILM COATED, EXTENDED RELEASE ORAL 2 TIMES DAILY
Qty: 180 TABLET | Refills: 2 | Status: SHIPPED | OUTPATIENT
Start: 2024-04-19 | End: 2024-05-29 | Stop reason: SDUPTHER

## 2024-04-19 ASSESSMENT — ENCOUNTER SYMPTOMS: BLURRED VISION: 0

## 2024-04-19 ASSESSMENT — PATIENT HEALTH QUESTIONNAIRE - PHQ9
1. LITTLE INTEREST OR PLEASURE IN DOING THINGS: NOT AT ALL
2. FEELING DOWN, DEPRESSED OR HOPELESS: NOT AT ALL
SUM OF ALL RESPONSES TO PHQ9 QUESTIONS 1 AND 2: 0

## 2024-04-19 NOTE — PATIENT INSTRUCTIONS
Please follow up with our clinical pharmacy team on Tuesday.   Continue with current medications.  Blood work before your next visit.  If you receive medical information from [facility], your results will be released into your online chart. This means you may view or see results before someone from our office contact you directly.  Please keep in mind that if blood work or imaging were ordered during your visit, all the nonurgent lab results will be discussed with you at your next office visit.  Please arrive 15 minutes before your appointment.   Follow-up with primary care in 3 months or as needed

## 2024-04-19 NOTE — PROGRESS NOTES
Subjective   Patient ID: Babak Forte is a pleasant 59 y.o. male who presents for Follow-up (Follow up- DM).  Diabetes  He presents for his follow-up diabetic visit. He has type 2 diabetes mellitus. There are no hypoglycemic associated symptoms. Pertinent negatives for diabetes include no blurred vision and no chest pain. There are no hypoglycemic complications. Risk factors for coronary artery disease include diabetes mellitus, male sex and obesity. Home blood sugar record trend: he is not checking his gluc consistently.   He has noted some neuropathy in the second and third toes on the left.    Lab Results   Component Value Date    HGBA1C 8.9 (H) 02/01/2024       Review of Systems   Eyes:  Negative for blurred vision.   Cardiovascular:  Negative for chest pain.   All other systems reviewed and are negative.      Visit Vitals  /60 (Patient Position: Sitting)   Pulse 89   Temp 36.6 °C (97.8 °F)   Resp 12          Objective   Physical Exam  Constitutional:       General: He is not in acute distress.     Appearance: Normal appearance.   HENT:      Head: Normocephalic and atraumatic.   Eyes:      General: No scleral icterus.     Conjunctiva/sclera: Conjunctivae normal.   Cardiovascular:      Rate and Rhythm: Normal rate and regular rhythm.      Heart sounds: Normal heart sounds.   Pulmonary:      Effort: Pulmonary effort is normal.      Breath sounds: Normal breath sounds. No wheezing.   Abdominal:      General: Bowel sounds are normal. There is no distension.      Palpations: Abdomen is soft.      Tenderness: There is no abdominal tenderness.   Musculoskeletal:      Cervical back: Neck supple.      Right lower leg: No edema.      Left lower leg: No edema.   Lymphadenopathy:      Cervical: No cervical adenopathy.   Skin:     General: Skin is warm and dry.      Comments: Onychomycosis of the toenails   Neurological:      General: No focal deficit present.      Mental Status: He is alert and oriented to person,  place, and time.   Psychiatric:         Mood and Affect: Mood normal.         Behavior: Behavior normal.         Assessment/Plan   Problem List Items Addressed This Visit       Benign essential hypertension     Stable, continue with current medications         Type 2 diabetes mellitus with stage 3a chronic kidney disease, without long-term current use of insulin (Multi) - Primary    Relevant Medications    metFORMIN XR (Glumetza) 1,000 mg 24 hr tablet    Other Relevant Orders    Comprehensive Metabolic Panel    Hemoglobin A1C     Other Visit Diagnoses       Prostate cancer screening        Relevant Orders    Prostate Spec.Ag,Screen

## 2024-05-01 ENCOUNTER — TELEPHONE (OUTPATIENT)
Dept: PRIMARY CARE | Facility: CLINIC | Age: 59
End: 2024-05-01
Payer: COMMERCIAL

## 2024-05-01 DIAGNOSIS — R11.2 NAUSEA AND VOMITING, UNSPECIFIED VOMITING TYPE: Primary | ICD-10-CM

## 2024-05-01 NOTE — TELEPHONE ENCOUNTER
Patient c/o vomiting spit and gagging off and on for months. Declined UR and ER for now. He has blood work orders. Is there anything you can order for his stomach? Patient was seen 4/19/24 and did not discuss this problem with you. He knows you're off today.

## 2024-05-03 DIAGNOSIS — R11.10 VOMITING, UNSPECIFIED VOMITING TYPE, UNSPECIFIED WHETHER NAUSEA PRESENT: Primary | ICD-10-CM

## 2024-05-03 RX ORDER — ONDANSETRON 8 MG/1
8 TABLET, ORALLY DISINTEGRATING ORAL EVERY 8 HOURS PRN
Qty: 20 TABLET | Refills: 0 | Status: SHIPPED | OUTPATIENT
Start: 2024-05-03 | End: 2024-05-10

## 2024-05-04 ENCOUNTER — LAB (OUTPATIENT)
Dept: LAB | Facility: LAB | Age: 59
End: 2024-05-04
Payer: COMMERCIAL

## 2024-05-04 DIAGNOSIS — E11.22 TYPE 2 DIABETES MELLITUS WITH STAGE 3A CHRONIC KIDNEY DISEASE, WITHOUT LONG-TERM CURRENT USE OF INSULIN (MULTI): ICD-10-CM

## 2024-05-04 DIAGNOSIS — N18.31 TYPE 2 DIABETES MELLITUS WITH STAGE 3A CHRONIC KIDNEY DISEASE, WITHOUT LONG-TERM CURRENT USE OF INSULIN (MULTI): ICD-10-CM

## 2024-05-04 DIAGNOSIS — Z12.5 PROSTATE CANCER SCREENING: ICD-10-CM

## 2024-05-04 PROCEDURE — 84153 ASSAY OF PSA TOTAL: CPT

## 2024-05-04 PROCEDURE — 36415 COLL VENOUS BLD VENIPUNCTURE: CPT

## 2024-05-04 PROCEDURE — 83036 HEMOGLOBIN GLYCOSYLATED A1C: CPT

## 2024-05-04 PROCEDURE — 80053 COMPREHEN METABOLIC PANEL: CPT

## 2024-05-06 LAB
ALBUMIN SERPL BCP-MCNC: 4.4 G/DL (ref 3.4–5)
ALP SERPL-CCNC: 75 U/L (ref 33–136)
ALT SERPL W P-5'-P-CCNC: 41 U/L (ref 10–52)
ANION GAP SERPL CALC-SCNC: 16 MMOL/L (ref 10–20)
AST SERPL W P-5'-P-CCNC: 26 U/L (ref 9–39)
BILIRUB SERPL-MCNC: 0.6 MG/DL (ref 0–1.2)
BUN SERPL-MCNC: 34 MG/DL (ref 6–23)
CALCIUM SERPL-MCNC: 9.7 MG/DL (ref 8.6–10.6)
CHLORIDE SERPL-SCNC: 101 MMOL/L (ref 98–107)
CO2 SERPL-SCNC: 27 MMOL/L (ref 21–32)
CREAT SERPL-MCNC: 1.99 MG/DL (ref 0.5–1.3)
EGFRCR SERPLBLD CKD-EPI 2021: 38 ML/MIN/1.73M*2
EST. AVERAGE GLUCOSE BLD GHB EST-MCNC: 243 MG/DL
GLUCOSE SERPL-MCNC: 138 MG/DL (ref 74–99)
HBA1C MFR BLD: 10.1 %
POTASSIUM SERPL-SCNC: 4.9 MMOL/L (ref 3.5–5.3)
PROT SERPL-MCNC: 7.2 G/DL (ref 6.4–8.2)
PSA SERPL-MCNC: 1.74 NG/ML
SODIUM SERPL-SCNC: 139 MMOL/L (ref 136–145)

## 2024-05-13 ENCOUNTER — TELEPHONE (OUTPATIENT)
Dept: PRIMARY CARE | Facility: CLINIC | Age: 59
End: 2024-05-13
Payer: COMMERCIAL

## 2024-05-13 DIAGNOSIS — E11.22 TYPE 2 DIABETES MELLITUS WITH STAGE 3A CHRONIC KIDNEY DISEASE, WITHOUT LONG-TERM CURRENT USE OF INSULIN (MULTI): ICD-10-CM

## 2024-05-13 DIAGNOSIS — N18.31 TYPE 2 DIABETES MELLITUS WITH STAGE 3A CHRONIC KIDNEY DISEASE, WITHOUT LONG-TERM CURRENT USE OF INSULIN (MULTI): ICD-10-CM

## 2024-05-13 RX ORDER — DULAGLUTIDE 4.5 MG/.5ML
4.5 INJECTION, SOLUTION SUBCUTANEOUS
Qty: 2 ML | Refills: 3 | Status: SHIPPED | OUTPATIENT
Start: 2024-05-13 | End: 2024-05-24 | Stop reason: SINTOL

## 2024-05-13 NOTE — TELEPHONE ENCOUNTER
Patient was last seen 4/19/24. Blood sugar levels are high. It was between 419 and 485, yesterday.   Rx refill needed. Trulicity. Out of medicine.   He stated [facility] does not have it.  [facility]   Phone: [phone]   Fax: [phone]

## 2024-05-13 NOTE — TELEPHONE ENCOUNTER
Pt called again re his Trulicity   Very concerned, glucose level is 400  Has frequent urination  Urgently needs an advice  TY

## 2024-05-14 PROCEDURE — RXMED WILLOW AMBULATORY MEDICATION CHARGE

## 2024-05-16 ENCOUNTER — PHARMACY VISIT (OUTPATIENT)
Dept: PHARMACY | Facility: CLINIC | Age: 59
End: 2024-05-16
Payer: COMMERCIAL

## 2024-05-17 ENCOUNTER — TELEMEDICINE (OUTPATIENT)
Dept: PHARMACY | Facility: HOSPITAL | Age: 59
End: 2024-05-17
Payer: COMMERCIAL

## 2024-05-17 DIAGNOSIS — N18.31 TYPE 2 DIABETES MELLITUS WITH STAGE 3A CHRONIC KIDNEY DISEASE, WITHOUT LONG-TERM CURRENT USE OF INSULIN (MULTI): ICD-10-CM

## 2024-05-17 DIAGNOSIS — E11.22 TYPE 2 DIABETES MELLITUS WITH STAGE 3A CHRONIC KIDNEY DISEASE, WITHOUT LONG-TERM CURRENT USE OF INSULIN (MULTI): ICD-10-CM

## 2024-05-17 RX ORDER — BLOOD SUGAR DIAGNOSTIC
STRIP MISCELLANEOUS
Qty: 100 EACH | Refills: 0 | Status: SHIPPED | OUTPATIENT
Start: 2024-05-17

## 2024-05-17 RX ORDER — INSULIN GLARGINE 100 [IU]/ML
10 INJECTION, SOLUTION SUBCUTANEOUS DAILY
Qty: 15 ML | Refills: 0 | Status: SHIPPED | OUTPATIENT
Start: 2024-05-17 | End: 2024-06-07 | Stop reason: SDUPTHER

## 2024-05-17 ASSESSMENT — ENCOUNTER SYMPTOMS: BLURRED VISION: 0

## 2024-05-17 NOTE — Clinical Note
BG uncontrolled. Starting Lantus 10 units, Trulicity resuming today, continue Farixga and metformin for now will aim to stop metformin once BG < 200 due to EGFR. Thank you!

## 2024-05-17 NOTE — PROGRESS NOTES
Pharmacist Clinic: Diabetes Management  Babak Forte is a 59 y.o. male who presents for a follow-up evaluation of his Type 2 diabetes mellitus.   Referring Provider: Ashley Turcios MD    Diabetes  He presents for his follow-up diabetic visit. He has type 2 diabetes mellitus. His disease course has been worsening. Associated symptoms include foot paresthesias and polyuria. Pertinent negatives for diabetes include no blurred vision and no chest pain. There are no hypoglycemic complications. Diabetic complications include nephropathy and peripheral neuropathy.   Missed 1-2 doses of Trulicity 4.5mg due to product backorder. Since off Trulicity, random BG checks in 300-400's (up from 200's with Trulicity). Since off Trulicity, reports frequent urination 5-6 times throughout the night which is an increase from 1-2. He is taking Farxiga BID. Nausea comes and goes - none in past two weeks. Dry heaving, one incident of vomiting spit. Nausea worse day after Trulicity injection. Reports lower right back pain, chronic.   A1c up to 10.1% from 8.9% in past 3 months. During this time Trulicity increased from 3mg to 4.5mg, new start of Farixga, and stopped glyburide 10mg BID.   Alcohol use: 12 beers this week  Eating 2-3 pieces of candy daily. No significant change in diet or exercise.     Wife has diabetes on insulin , she gave him a dose of her insulin when his BG was 475. He did not check is sugar after so does not know how he responded.    No Known Allergies    LAB REVIEW   Glucose (mg/dL)   Date Value   05/04/2024 138 (H)   02/01/2024 178 (H)   09/16/2023 143 (H)   01/15/2023 176 (H)   01/13/2023 163 (H)     Hemoglobin A1C (%)   Date Value   05/04/2024 10.1 (H)   02/01/2024 8.9 (H)   09/16/2023 8.9 (A)   01/09/2023 8.4 (A)   07/08/2022 8.6 (A)     Urea Nitrogen (mg/dL)   Date Value   05/04/2024 34 (H)   02/01/2024 20   09/16/2023 15   01/15/2023 13   01/13/2023 16     Creatinine (mg/dL)   Date Value   05/04/2024 1.99 (H)  "  02/01/2024 1.82 (H)   09/16/2023 1.69 (H)   01/15/2023 1.51 (H)   01/13/2023 1.43 (H)     No results found for: \"ALBUR\", \"[id number]\"  Lab Results   Component Value Date    CHOL 141 02/01/2024    CHOL 124 09/16/2023    CHOL 157 01/09/2023     Lab Results   Component Value Date    HDL 27.7 02/01/2024    HDL 30.9 (A) 09/16/2023    HDL 31.1 (A) 01/09/2023     Lab Results   Component Value Date    LDLCALC 56 02/01/2024     Lab Results   Component Value Date    TRIG 285 (H) 02/01/2024    TRIG 305 (H) 01/09/2023    TRIG 79 11/09/2020     The 10-year ASCVD risk score (Susy RICHARDSON, et al., 2019) is: 23.7%    Values used to calculate the score:      Age: 59 years      Sex: Male      Is Non- : Yes      Diabetic: Yes      Tobacco smoker: No      Systolic Blood Pressure: 122 mmHg      Is BP treated: Yes      HDL Cholesterol: 27.7 mg/dL      Total Cholesterol: 141 mg/dL     DIABETES ASSESSMENT  CURRENT PHARMACOTHERAPY  - Farxiga 10mg once daily (started 2 mo ago)  - Metformin XR 1000mg twice daily  - Trulicity 4.5mg subcutaneous once weekly (Sunday)    SECONDARY PREVENTION  - Statin? Yes  - ACE-I/ARB? Yes  - Aspirin? Yes  - Last eye exam:  - Last diabetic foot exam:    HISTORICAL PHARMACOTHERAPY  - Glyburide (weight gain, renal)    SMBG MEASUREMENTS  Patient is using: glucometer once daily, not consistent  The patient is currently checking the blood glucose 1 times per day.  Patient does not report symptoms of hypoglycemia. Patient denies dizziness, jitteriness, and sweating.  Hypoglycemia awareness: Never had low incident, reviewed s/s  Patient does report symptoms of hyperglycemia. Patient reports excessive thirst and polyuria.    ADHERENCE/AFFORDABILITY  - No cost or adherence barriers    Assessment/Plan   Problem List Items Addressed This Visit    None    Patients diabetes is poorly controlled with most recent A1c of 10.1% (Goal < 7%).   Initiate: Lantus 10 units once daily due to persistent elevated BG " despite Trulicity dose optimization and limitation of therapy additions due to renal function  Counseled on med MOA, administration, side effects, monitoring   (Tresiba non-formulary)  Continue: Trulicity 4.5mg once weekly (mail delivery today), Farxiga 10mg once daily (counseled to take in the morning to reduce nighttime urination), Metformin XR 1000mg twice daily   Goal will be to optimize insulin dosing for BG control and to discontinue metformin to reduce risk of adverse effects in CKD3 (eGFR 38)  Patient prefers to continue Trulicity despite GI s/e - continue to monitor. Regular alcohol use, last  so may be higher risk for pancreatitis. Agree to continue for now due to CKD benefits however low threshold to stop for continued or worsened GI s/e. Currently there is a national backorder for Trulicity, Ozempic, and Mounjaro.     Blood sugar goals  - Fastin - 130 mg/dL  - 2 hours after meal: less than 180 mg/dL  - A1c: less than 7%     PharmD Follow-up: 1 week for insulin adjustment, BG review  PCP Follow-up: Not scheduled, last seen 24 - PCP leaving practice will need to establish with new provider    Notify your healthcare provider if you have any of the following:  -Diarrhea or vomiting for more than six hours  -Blood sugar >300 mg/dL more than once  -Low blood sugar (<70 mg/dL)  -Questions about your medications    Thank you,  Kassie Hammonds, PharmD    Continue all meds under the continuation of care with the referring provider and clinical pharmacy team.  Verbal consent to manage patient's drug therapy was obtained. They were informed they may decline to participate or withdraw from participation in pharmacy services at any time.

## 2024-05-24 ENCOUNTER — TELEMEDICINE (OUTPATIENT)
Dept: PHARMACY | Facility: HOSPITAL | Age: 59
End: 2024-05-24
Payer: COMMERCIAL

## 2024-05-24 DIAGNOSIS — E11.22 TYPE 2 DIABETES MELLITUS WITH STAGE 3A CHRONIC KIDNEY DISEASE, WITHOUT LONG-TERM CURRENT USE OF INSULIN (MULTI): ICD-10-CM

## 2024-05-24 DIAGNOSIS — N18.31 TYPE 2 DIABETES MELLITUS WITH STAGE 3A CHRONIC KIDNEY DISEASE, WITHOUT LONG-TERM CURRENT USE OF INSULIN (MULTI): ICD-10-CM

## 2024-05-24 ASSESSMENT — ENCOUNTER SYMPTOMS: BLURRED VISION: 0

## 2024-05-24 NOTE — Clinical Note
Stopping trulicity due to N/V. Increasing lantus to 20 units will FU in 1 week for insulin adjustments. Thank you!

## 2024-05-24 NOTE — PROGRESS NOTES
"Pharmacist Clinic: Diabetes Management  Babak Forte is a 59 y.o. male who presents for a follow-up evaluation of his Type 2 diabetes mellitus.   Referring Provider: Ashley Turcios MD    Diabetes  He presents for his follow-up diabetic visit. He has type 2 diabetes mellitus. His disease course has been worsening. Associated symptoms include foot paresthesias and polyuria. Pertinent negatives for diabetes include no blurred vision and no chest pain. There are no hypoglycemic complications. Diabetic complications include nephropathy and peripheral neuropathy.   Since last visit, started Lantus 10 units daily and resumed Trulicity after missing 2 doses due to product backorder.   Resumed Trulicity - intolerable N/V, would like to stop, believes had s/e even with lower doses.  No issues with insulin administration, injecting in abdomen, giving in AM, started on Monday. Reports highest  (down from 400's)    Wife has diabetes on insulin , she gave him a dose of her insulin when his BG was 475. He did not check is sugar after so does not know how he responded.    No Known Allergies    LAB REVIEW   Glucose (mg/dL)   Date Value   05/04/2024 138 (H)   02/01/2024 178 (H)   09/16/2023 143 (H)   01/15/2023 176 (H)   01/13/2023 163 (H)     Hemoglobin A1C (%)   Date Value   05/04/2024 10.1 (H)   02/01/2024 8.9 (H)   09/16/2023 8.9 (A)   01/09/2023 8.4 (A)   07/08/2022 8.6 (A)     Urea Nitrogen (mg/dL)   Date Value   05/04/2024 34 (H)   02/01/2024 20   09/16/2023 15   01/15/2023 13   01/13/2023 16     Creatinine (mg/dL)   Date Value   05/04/2024 1.99 (H)   02/01/2024 1.82 (H)   09/16/2023 1.69 (H)   01/15/2023 1.51 (H)   01/13/2023 1.43 (H)     No results found for: \"ALBUR\", \"[id number]\"  Lab Results   Component Value Date    CHOL 141 02/01/2024    CHOL 124 09/16/2023    CHOL 157 01/09/2023     Lab Results   Component Value Date    HDL 27.7 02/01/2024    HDL 30.9 (A) 09/16/2023    HDL 31.1 (A) 01/09/2023     Lab Results "   Component Value Date    LDLCALC 56 02/01/2024     Lab Results   Component Value Date    TRIG 285 (H) 02/01/2024    TRIG 305 (H) 01/09/2023    TRIG 79 11/09/2020     The 10-year ASCVD risk score (Susy RICHARDSON, et al., 2019) is: 23.7%    Values used to calculate the score:      Age: 59 years      Sex: Male      Is Non- : Yes      Diabetic: Yes      Tobacco smoker: No      Systolic Blood Pressure: 122 mmHg      Is BP treated: Yes      HDL Cholesterol: 27.7 mg/dL      Total Cholesterol: 141 mg/dL     DIABETES ASSESSMENT  CURRENT PHARMACOTHERAPY  - Farxiga 10mg once daily (started 2 mo ago)  - Metformin XR 1000mg twice daily    SECONDARY PREVENTION  - Statin? Yes  - ACE-I/ARB? Yes  - Aspirin? Yes    HISTORICAL PHARMACOTHERAPY  - Glyburide (weight gain, renal)    SMBG MEASUREMENTS  Patient is using: glucometer once daily, not consistent  The patient is currently checking the blood glucose 1 times per day.  Patient does not report symptoms of hypoglycemia. Patient denies dizziness, jitteriness, and sweating.  Hypoglycemia awareness: Never had low incident, reviewed s/s  Patient does report symptoms of hyperglycemia. Patient reports excessive thirst and polyuria.    ADHERENCE/AFFORDABILITY  - No cost or adherence barriers    Assessment/Plan   Problem List Items Addressed This Visit       Type 2 diabetes mellitus with stage 3a chronic kidney disease, without long-term current use of insulin (Multi)    Relevant Orders    Follow Up In [facility] - Pharmacy     Patients diabetes is poorly controlled with most recent A1c of 10.1% (Goal < 7%).   Increase Lantus to 20 units once daily   Counseled on med MOA, administration, side effects, monitoring   (Tresiba non-formulary)  Discontinue Trulicity (intolerable nausea)  Continue: Farxiga 10mg once daily (counseled to take in the morning to reduce nighttime urination), Metformin XR 1000mg twice daily   Goal will be to optimize insulin dosing for BG  control and to discontinue metformin to reduce risk of adverse effects in CKD3 (eGFR 38)    Blood sugar goals  - Fastin - 130 mg/dL  - 2 hours after meal: less than 180 mg/dL  - A1c: less than 7%     PharmD Follow-up: 1 week for insulin adjustment, BG review  PCP Follow-up: Not scheduled, last seen 24 - PCP leaving practice will need to establish with new provider    Notify your healthcare provider if you have any of the following:  -Diarrhea or vomiting for more than six hours  -Blood sugar >300 mg/dL more than once  -Low blood sugar (<70 mg/dL)  -Questions about your medications    Thank you,  Kassie Hammonds, PharmD    Continue all meds under the continuation of care with the referring provider and clinical pharmacy team.  Verbal consent to manage patient's drug therapy was obtained. They were informed they may decline to participate or withdraw from participation in pharmacy services at any time.

## 2024-05-29 DIAGNOSIS — N18.31 TYPE 2 DIABETES MELLITUS WITH STAGE 3A CHRONIC KIDNEY DISEASE, WITHOUT LONG-TERM CURRENT USE OF INSULIN (MULTI): ICD-10-CM

## 2024-05-29 DIAGNOSIS — E11.22 TYPE 2 DIABETES MELLITUS WITH STAGE 3A CHRONIC KIDNEY DISEASE, WITHOUT LONG-TERM CURRENT USE OF INSULIN (MULTI): ICD-10-CM

## 2024-05-29 RX ORDER — METFORMIN HYDROCHLORIDE 1000 MG/1
1000 TABLET, FILM COATED, EXTENDED RELEASE ORAL 2 TIMES DAILY
Qty: 180 TABLET | Refills: 2 | Status: SHIPPED | OUTPATIENT
Start: 2024-05-29 | End: 2024-06-03

## 2024-05-30 DIAGNOSIS — E11.22 TYPE 2 DIABETES MELLITUS WITH STAGE 3A CHRONIC KIDNEY DISEASE, WITHOUT LONG-TERM CURRENT USE OF INSULIN (MULTI): ICD-10-CM

## 2024-05-30 DIAGNOSIS — N18.31 TYPE 2 DIABETES MELLITUS WITH STAGE 3A CHRONIC KIDNEY DISEASE, WITHOUT LONG-TERM CURRENT USE OF INSULIN (MULTI): ICD-10-CM

## 2024-05-31 ENCOUNTER — TELEMEDICINE (OUTPATIENT)
Dept: PHARMACY | Facility: HOSPITAL | Age: 59
End: 2024-05-31
Payer: COMMERCIAL

## 2024-05-31 DIAGNOSIS — N18.31 TYPE 2 DIABETES MELLITUS WITH STAGE 3A CHRONIC KIDNEY DISEASE, WITHOUT LONG-TERM CURRENT USE OF INSULIN (MULTI): ICD-10-CM

## 2024-05-31 DIAGNOSIS — E11.22 TYPE 2 DIABETES MELLITUS WITH STAGE 3A CHRONIC KIDNEY DISEASE, WITHOUT LONG-TERM CURRENT USE OF INSULIN (MULTI): ICD-10-CM

## 2024-05-31 ASSESSMENT — ENCOUNTER SYMPTOMS: BLURRED VISION: 0

## 2024-05-31 NOTE — PROGRESS NOTES
"Pharmacist Clinic: Diabetes Management  Babak Forte is a 59 y.o. male who presents for a follow-up evaluation of his Type 2 diabetes mellitus.   Referring Provider: Ashley Turcios MD    Diabetes  He presents for his follow-up diabetic visit. He has type 2 diabetes mellitus. His disease course has been worsening. Associated symptoms include foot paresthesias and polyuria. Pertinent negatives for diabetes include no blurred vision and no chest pain. There are no hypoglycemic complications. Diabetic complications include nephropathy and peripheral neuropathy.   Since last visit, increased Lantus to 20 units daily and stopped Trulicity due to N/V. No symptoms of high or low sugars. Awakes to urinate 3x (down from 6x). Drinks ~50 ounces water daily. Works until 2-3 AM.  Discussed CGM - not interested, prefers fingerstick - has one glucometer for work and one for home.    No Known Allergies    LAB REVIEW   Glucose (mg/dL)   Date Value   05/04/2024 138 (H)   02/01/2024 178 (H)   09/16/2023 143 (H)   01/15/2023 176 (H)   01/13/2023 163 (H)     Hemoglobin A1C (%)   Date Value   05/04/2024 10.1 (H)   02/01/2024 8.9 (H)   09/16/2023 8.9 (A)   01/09/2023 8.4 (A)   07/08/2022 8.6 (A)     Urea Nitrogen (mg/dL)   Date Value   05/04/2024 34 (H)   02/01/2024 20   09/16/2023 15   01/15/2023 13   01/13/2023 16     Creatinine (mg/dL)   Date Value   05/04/2024 1.99 (H)   02/01/2024 1.82 (H)   09/16/2023 1.69 (H)   01/15/2023 1.51 (H)   01/13/2023 1.43 (H)     No results found for: \"ALBUR\", \"SUL40PRW\"  Lab Results   Component Value Date    CHOL 141 02/01/2024    CHOL 124 09/16/2023    CHOL 157 01/09/2023     Lab Results   Component Value Date    HDL 27.7 02/01/2024    HDL 30.9 (A) 09/16/2023    HDL 31.1 (A) 01/09/2023     Lab Results   Component Value Date    LDLCALC 56 02/01/2024     Lab Results   Component Value Date    TRIG 285 (H) 02/01/2024    TRIG 305 (H) 01/09/2023    TRIG 79 11/09/2020     The 10-year ASCVD risk score (Susy " DYLAN, et al., 2019) is: 23.7%    Values used to calculate the score:      Age: 59 years      Sex: Male      Is Non- : Yes      Diabetic: Yes      Tobacco smoker: No      Systolic Blood Pressure: 122 mmHg      Is BP treated: Yes      HDL Cholesterol: 27.7 mg/dL      Total Cholesterol: 141 mg/dL     DIABETES ASSESSMENT  CURRENT PHARMACOTHERAPY  - Farxiga 10mg once daily (started 2 mo ago)  - Metformin XR 1000mg twice daily    SECONDARY PREVENTION  - Statin? Yes  - ACE-I/ARB? Yes  - Aspirin? Yes    HISTORICAL PHARMACOTHERAPY  - Glyburide (weight gain, renal)  - Trulicity (N/V)    SMBG MEASUREMENTS  Patient is using: glucometer once daily, not consistent  The patient is currently checking the blood glucose 1-2 times per day.  BG yesterday 168 (lowest in past week)   Usual 200-250, No longer > 300  Patient does not report symptoms of hypoglycemia. Patient denies dizziness, jitteriness, and sweating.  Hypoglycemia awareness: Never had low incident, reviewed s/s  Patient does report symptoms of hyperglycemia. Patient reports excessive thirst and polyuria.    ADHERENCE/AFFORDABILITY  - No cost or adherence barriers    Assessment/Plan   Problem List Items Addressed This Visit       Type 2 diabetes mellitus with stage 3a chronic kidney disease, without long-term current use of insulin (Multi)       Patients diabetes is poorly controlled with most recent A1c of 10.1% (Goal < 7%).   Increase Lantus to 24 units once daily   Counseled on med MOA, administration, side effects, monitoring   (Tresiba non-formulary)  Continue: Farxiga 10mg once daily (counseled to take in the morning to reduce nighttime urination), Metformin XR 1000mg twice daily   Goal will be to optimize insulin dosing for BG control and to discontinue metformin to reduce risk of adverse effects in CKD3 (eGFR 38) - now that BG maintaining < 300 anticipate to stop metformin next week pending BG review    Blood sugar goals  - Fastin - 130  mg/dL  - 2 hours after meal: less than 180 mg/dL  - A1c: less than 7%     PharmD Follow-up: 1 week for insulin adjustment, BG review  PCP Follow-up: Not scheduled, last seen 7/19/24 - PCP leaving practice will need to establish with new provider    Notify your healthcare provider if you have any of the following:  -Diarrhea or vomiting for more than six hours  -Blood sugar >300 mg/dL more than once  -Low blood sugar (<70 mg/dL)  -Questions about your medications    Thank you,  Kassie Hammonds, PharmD    Continue all meds under the continuation of care with the referring provider and clinical pharmacy team.  Verbal consent to manage patient's drug therapy was obtained. They were informed they may decline to participate or withdraw from participation in pharmacy services at any time.

## 2024-06-03 RX ORDER — METFORMIN HYDROCHLORIDE 500 MG/1
1000 TABLET, EXTENDED RELEASE ORAL 2 TIMES DAILY
Qty: 180 TABLET | Refills: 3 | Status: SHIPPED | OUTPATIENT
Start: 2024-06-03

## 2024-06-07 ENCOUNTER — TELEMEDICINE (OUTPATIENT)
Dept: PHARMACY | Facility: HOSPITAL | Age: 59
End: 2024-06-07
Payer: COMMERCIAL

## 2024-06-07 DIAGNOSIS — E11.22 TYPE 2 DIABETES MELLITUS WITH STAGE 3A CHRONIC KIDNEY DISEASE, WITHOUT LONG-TERM CURRENT USE OF INSULIN (MULTI): ICD-10-CM

## 2024-06-07 DIAGNOSIS — N18.31 TYPE 2 DIABETES MELLITUS WITH STAGE 3A CHRONIC KIDNEY DISEASE, WITHOUT LONG-TERM CURRENT USE OF INSULIN (MULTI): ICD-10-CM

## 2024-06-07 RX ORDER — INSULIN GLARGINE 100 [IU]/ML
28 INJECTION, SOLUTION SUBCUTANEOUS DAILY
Qty: 15 ML | Refills: 0 | Status: SHIPPED | OUTPATIENT
Start: 2024-06-07

## 2024-06-07 ASSESSMENT — ENCOUNTER SYMPTOMS: BLURRED VISION: 0

## 2024-06-07 NOTE — PROGRESS NOTES
"Pharmacist Clinic: Diabetes Management  Babak Forte is a 59 y.o. male who presents for a follow-up evaluation of his Type 2 diabetes mellitus.   Referring Provider: Ashley Turcios MD    Diabetes  He presents for his follow-up diabetic visit. He has type 2 diabetes mellitus. His disease course has been worsening. Associated symptoms include foot paresthesias and polyuria. Pertinent negatives for diabetes include no blurred vision and no chest pain. There are no hypoglycemic complications. Diabetic complications include nephropathy and peripheral neuropathy.   Since last visit, increased Lantus to 24 units daily. Reports inconsistent blood sugar checking this past week and lapse in diet. Had a soda for two days and BG was 250-300. Had [employer] one night. Missed one dose of Lantus this past week. He is working on routine for med administration, easily forgets on the weekend. If he forgets a few doses of metformin and resumes he will have GI side effects otherwise tolerates well.     No symptoms of high or low sugars. Awakes to urinate 3x (down from 6x). Drinks ~50 ounces water daily. Works until 2-3 AM.  Discussed CGM - not interested, prefers fingerstick - has one glucometer for work and one for home.    No Known Allergies    LAB REVIEW   Glucose (mg/dL)   Date Value   05/04/2024 138 (H)   02/01/2024 178 (H)   09/16/2023 143 (H)   01/15/2023 176 (H)   01/13/2023 163 (H)     Hemoglobin A1C (%)   Date Value   05/04/2024 10.1 (H)   02/01/2024 8.9 (H)   09/16/2023 8.9 (A)   01/09/2023 8.4 (A)   07/08/2022 8.6 (A)     Urea Nitrogen (mg/dL)   Date Value   05/04/2024 34 (H)   02/01/2024 20   09/16/2023 15   01/15/2023 13   01/13/2023 16     Creatinine (mg/dL)   Date Value   05/04/2024 1.99 (H)   02/01/2024 1.82 (H)   09/16/2023 1.69 (H)   01/15/2023 1.51 (H)   01/13/2023 1.43 (H)     No results found for: \"ALBUR\", \"[id number]\"  Lab Results   Component Value Date    CHOL 141 02/01/2024    CHOL 124 09/16/2023    CHOL 157 " 01/09/2023     Lab Results   Component Value Date    HDL 27.7 02/01/2024    HDL 30.9 (A) 09/16/2023    HDL 31.1 (A) 01/09/2023     Lab Results   Component Value Date    LDLCALC 56 02/01/2024     Lab Results   Component Value Date    TRIG 285 (H) 02/01/2024    TRIG 305 (H) 01/09/2023    TRIG 79 11/09/2020     The 10-year ASCVD risk score (Susy RICHARDSON, et al., 2019) is: 23.7%    Values used to calculate the score:      Age: 59 years      Sex: Male      Is Non- : Yes      Diabetic: Yes      Tobacco smoker: No      Systolic Blood Pressure: 122 mmHg      Is BP treated: Yes      HDL Cholesterol: 27.7 mg/dL      Total Cholesterol: 141 mg/dL     DIABETES ASSESSMENT  CURRENT PHARMACOTHERAPY  - Farxiga 10mg once daily (started 2 mo ago)  - Metformin XR 1000mg twice daily  - Lantus 24 units once daily     SECONDARY PREVENTION  - Statin? Yes  - ACE-I/ARB? Yes  - Aspirin? Yes    HISTORICAL PHARMACOTHERAPY  - Glyburide (weight gain, renal)  - Trulicity (N/V)    SMBG MEASUREMENTS  Patient is using: glucometer once daily, not consistent  The patient is currently checking the blood glucose 1-2 times per day.  Minimal BG results to report today due to non-adherence to checking, reports 200-300 range   Patient does not report symptoms of hypoglycemia. Patient denies dizziness, jitteriness, and sweating.  Hypoglycemia awareness: Never had low incident, reviewed s/s  Patient does report symptoms of hyperglycemia. Patient reports excessive thirst and polyuria.    ADHERENCE/AFFORDABILITY  - No cost barriers  - Adherence barriers: schedule    Assessment/Plan   Problem List Items Addressed This Visit       Type 2 diabetes mellitus with stage 3a chronic kidney disease, without long-term current use of insulin (Multi)       Patients diabetes is poorly controlled with most recent A1c of 10.1% (Goal < 7%).   Increase Lantus to 28 units once daily   Counseled on med MOA, administration, side effects, monitoring  Encouraged  efforts for developing routine for medication administration; opt for protein + veg versus simple carbohydrates, meal plan when you have time to avoid fast food with busy schedule  Continue: Farxiga 10mg once daily (counseled to take in the morning to reduce nighttime urination), Metformin XR 1000mg twice daily   Goal will be to optimize insulin dosing for BG control and to discontinue metformin to reduce risk of adverse effects in CKD3 (eGFR 38) - now that BG maintaining < 300 anticipate to stop metformin next week pending BG review    Blood sugar goals  - Fastin - 130 mg/dL  - 2 hours after meal: less than 180 mg/dL  - A1c: less than 7%     PharmD Follow-up: 1 week for insulin adjustment, BG review  PCP Follow-up: Not scheduled, last seen 24 - PCP leaving practice will need to establish with new provider    Notify your healthcare provider if you have any of the following:  -Diarrhea or vomiting for more than six hours  -Blood sugar >300 mg/dL more than once  -Low blood sugar (<70 mg/dL)  -Questions about your medications    Thank you,  Kassie Hammonds, PharmD    Continue all meds under the continuation of care with the referring provider and clinical pharmacy team.  Verbal consent to manage patient's drug therapy was obtained. They were informed they may decline to participate or withdraw from participation in pharmacy services at any time.

## 2024-06-14 ENCOUNTER — APPOINTMENT (OUTPATIENT)
Dept: PHARMACY | Facility: HOSPITAL | Age: 59
End: 2024-06-14
Payer: COMMERCIAL

## 2024-06-21 ENCOUNTER — APPOINTMENT (OUTPATIENT)
Dept: PHARMACY | Facility: HOSPITAL | Age: 59
End: 2024-06-21
Payer: COMMERCIAL

## 2024-06-28 ENCOUNTER — TELEMEDICINE (OUTPATIENT)
Dept: PHARMACY | Facility: HOSPITAL | Age: 59
End: 2024-06-28
Payer: COMMERCIAL

## 2024-06-28 DIAGNOSIS — N18.31 TYPE 2 DIABETES MELLITUS WITH STAGE 3A CHRONIC KIDNEY DISEASE, WITHOUT LONG-TERM CURRENT USE OF INSULIN (MULTI): ICD-10-CM

## 2024-06-28 DIAGNOSIS — E11.22 TYPE 2 DIABETES MELLITUS WITH STAGE 3A CHRONIC KIDNEY DISEASE, WITHOUT LONG-TERM CURRENT USE OF INSULIN (MULTI): ICD-10-CM

## 2024-06-28 ASSESSMENT — ENCOUNTER SYMPTOMS: BLURRED VISION: 0

## 2024-06-28 NOTE — PROGRESS NOTES
Pharmacist Clinic: Diabetes Management  Babak Forte is a 59 y.o. male who presents for a follow-up evaluation of his Type 2 diabetes mellitus.   Referring Provider: Ashley Turcios MD --> Cassy Dean MD    Diabetes  He presents for his follow-up diabetic visit. He has type 2 diabetes mellitus. His disease course has been worsening. Associated symptoms include foot paresthesias and polyuria. Pertinent negatives for diabetes include no blurred vision and no chest pain. There are no hypoglycemic complications. Diabetic complications include nephropathy and peripheral neuropathy.   Since last visit, increased Lantus to 28 units daily. Reports blood sugars fluctuating 180-325. Had candy and pop last night BG this . No BG below 150. He reports circulation seems to have improved in toes since starting insulin, still with tingling in left two middle toes, no pain or injuries. Adherence improved with motivation to control sugars. Denies missed dosing of insulin, a few days delayed dose to afternoon. Sometimes missed evening dose of metformin. Polyuria remains consistent, no symptoms of UTI.    No symptoms of high or low sugars. Awakes to urinate 3x (down from 6x). Drinks ~50 ounces water daily. Works until 2-3 AM.  Discussed CGM - not interested, prefers fingerstick - has one glucometer for work and one for home.    No Known Allergies    LAB REVIEW   Glucose (mg/dL)   Date Value   05/04/2024 138 (H)   02/01/2024 178 (H)   09/16/2023 143 (H)   01/15/2023 176 (H)   01/13/2023 163 (H)     Hemoglobin A1C (%)   Date Value   05/04/2024 10.1 (H)   02/01/2024 8.9 (H)   09/16/2023 8.9 (A)   01/09/2023 8.4 (A)   07/08/2022 8.6 (A)     Urea Nitrogen (mg/dL)   Date Value   05/04/2024 34 (H)   02/01/2024 20   09/16/2023 15   01/15/2023 13   01/13/2023 16     Creatinine (mg/dL)   Date Value   05/04/2024 1.99 (H)   02/01/2024 1.82 (H)   09/16/2023 1.69 (H)   01/15/2023 1.51 (H)   01/13/2023 1.43 (H)     No results  "found for: \"ALBUR\", \"[id number]\"  Lab Results   Component Value Date    CHOL 141 02/01/2024    CHOL 124 09/16/2023    CHOL 157 01/09/2023     Lab Results   Component Value Date    HDL 27.7 02/01/2024    HDL 30.9 (A) 09/16/2023    HDL 31.1 (A) 01/09/2023     Lab Results   Component Value Date    LDLCALC 56 02/01/2024     Lab Results   Component Value Date    TRIG 285 (H) 02/01/2024    TRIG 305 (H) 01/09/2023    TRIG 79 11/09/2020     The 10-year ASCVD risk score (Susy RICHARDSON, et al., 2019) is: 23.7%    Values used to calculate the score:      Age: 59 years      Sex: Male      Is Non- : Yes      Diabetic: Yes      Tobacco smoker: No      Systolic Blood Pressure: 122 mmHg      Is BP treated: Yes      HDL Cholesterol: 27.7 mg/dL      Total Cholesterol: 141 mg/dL     DIABETES ASSESSMENT  CURRENT PHARMACOTHERAPY  - Farxiga 10mg once daily   - Metformin XR 1000mg twice daily  - Lantus 28 units once daily     SECONDARY PREVENTION  - Statin? Yes  - ACE-I/ARB? Yes  - Aspirin? Yes    HISTORICAL PHARMACOTHERAPY  - Glyburide (weight gain, renal)  - Trulicity (N/V)    SMBG MEASUREMENTS  Patient is using: glucometer once daily, not consistent  The patient is currently checking the blood glucose 1-2 times per day.  Patient does not report symptoms of hypoglycemia. Patient denies dizziness, jitteriness, and sweating.  Hypoglycemia awareness: Never had low incident, reviewed s/s  Patient does report symptoms of hyperglycemia. Patient reports excessive thirst and polyuria.    ADHERENCE/AFFORDABILITY  - No cost barriers  - Adherence barriers: schedule    Assessment/Plan   Problem List Items Addressed This Visit    None      Patients diabetes is poorly controlled with most recent A1c of 10.1% (Goal < 7%).   Increase Lantus to 36 units once daily   Counseled on med MOA, administration, side effects, monitoring  Encouraged efforts for developing routine for medication administration; opt for protein + veg versus simple " carbohydrates, meal plan when you have time to avoid fast food with busy schedule  Decrease metformin XR to 1000mg once daily in the morning   Goal will be to optimize insulin dosing for BG control and to discontinue metformin to reduce risk of adverse effects in CKD3 (eGFR 38) -  anticipate to stop metformin at follow up pending BG review  Continue Farxiga 10mg once daily (take in the morning to reduce nighttime urination)    Blood sugar goals  - Fastin - 130 mg/dL  - 2 hours after meal: less than 180 mg/dL  - A1c: less than 7%     PharmD Follow-up:   PCP Follow-up:  establish visit with new provider     Notify your healthcare provider if you have any of the following:  -Diarrhea or vomiting for more than six hours  -Blood sugar >300 mg/dL more than once  -Low blood sugar (<70 mg/dL)  -Questions about your medications    Thank you,  Kassie Hammonds, PharmD    Continue all meds under the continuation of care with the referring provider and clinical pharmacy team.  Verbal consent to manage patient's drug therapy was obtained. They were informed they may decline to participate or withdraw from participation in pharmacy services at any time.

## 2024-06-28 NOTE — Clinical Note
Good morning. Babak is a previous pt of Dr. Turcios, establishing with you on 7/11. Pharmacy has been following for DM management - are you okay with us continuing this service? He may need a Lantus refill prior to his visit with you, may we submit this in the meantime? Thank you!

## 2024-07-11 ENCOUNTER — APPOINTMENT (OUTPATIENT)
Dept: PRIMARY CARE | Facility: CLINIC | Age: 59
End: 2024-07-11
Payer: COMMERCIAL

## 2024-07-11 VITALS
DIASTOLIC BLOOD PRESSURE: 72 MMHG | HEART RATE: 80 BPM | WEIGHT: 228 LBS | HEIGHT: 67 IN | OXYGEN SATURATION: 96 % | SYSTOLIC BLOOD PRESSURE: 118 MMHG | BODY MASS INDEX: 35.79 KG/M2

## 2024-07-11 DIAGNOSIS — E78.5 HYPERLIPIDEMIA, UNSPECIFIED HYPERLIPIDEMIA TYPE: ICD-10-CM

## 2024-07-11 DIAGNOSIS — Z79.4 TYPE 2 DIABETES MELLITUS WITHOUT COMPLICATION, WITH LONG-TERM CURRENT USE OF INSULIN (MULTI): ICD-10-CM

## 2024-07-11 DIAGNOSIS — E11.9 TYPE 2 DIABETES MELLITUS WITHOUT COMPLICATION, WITH LONG-TERM CURRENT USE OF INSULIN (MULTI): ICD-10-CM

## 2024-07-11 DIAGNOSIS — I10 BENIGN ESSENTIAL HYPERTENSION: Primary | ICD-10-CM

## 2024-07-11 PROCEDURE — 3078F DIAST BP <80 MM HG: CPT | Performed by: STUDENT IN AN ORGANIZED HEALTH CARE EDUCATION/TRAINING PROGRAM

## 2024-07-11 PROCEDURE — 3046F HEMOGLOBIN A1C LEVEL >9.0%: CPT | Performed by: STUDENT IN AN ORGANIZED HEALTH CARE EDUCATION/TRAINING PROGRAM

## 2024-07-11 PROCEDURE — 3008F BODY MASS INDEX DOCD: CPT | Performed by: STUDENT IN AN ORGANIZED HEALTH CARE EDUCATION/TRAINING PROGRAM

## 2024-07-11 PROCEDURE — 3048F LDL-C <100 MG/DL: CPT | Performed by: STUDENT IN AN ORGANIZED HEALTH CARE EDUCATION/TRAINING PROGRAM

## 2024-07-11 PROCEDURE — 3074F SYST BP LT 130 MM HG: CPT | Performed by: STUDENT IN AN ORGANIZED HEALTH CARE EDUCATION/TRAINING PROGRAM

## 2024-07-11 PROCEDURE — 99214 OFFICE O/P EST MOD 30 MIN: CPT | Performed by: STUDENT IN AN ORGANIZED HEALTH CARE EDUCATION/TRAINING PROGRAM

## 2024-07-11 PROCEDURE — 4010F ACE/ARB THERAPY RXD/TAKEN: CPT | Performed by: STUDENT IN AN ORGANIZED HEALTH CARE EDUCATION/TRAINING PROGRAM

## 2024-07-11 NOTE — PROGRESS NOTES
"Subjective   Patient ID: Babak Forte is a 59 y.o. male who presents for New Patient Visit.  HPI  Babak Forte is 59 y.o. is here to establish care  Former patient of Dr Turcios     Chronic active medical problems   hyperlipidemia  Hypertension- hydrochlorothiazide 25; lisinopril 40  Diabetes on Farxiga 10 mg, metformin extended release thousand twice daily Lantus 28    No concerns today.         Immunizations  Immunization History   Administered Date(s) Administered    Pfizer Purple Cap SARS-CoV-2 04/07/2021, 04/28/2021    Pneumococcal polysaccharide vaccine, 23-valent, age 2 years and older (PNEUMOVAX 23) 10/18/2012    Tdap vaccine, age 7 year and older (BOOSTRIX, ADACEL) 01/08/2020               Past Medical History:   Diagnosis Date    Personal history of other diseases of the circulatory system     History of diastolic dysfunction      Past Surgical History:   Procedure Laterality Date    ANKLE SURGERY  08/23/2013    Ankle Surgery    COLONOSCOPY  01/04/2016    Complete Colonoscopy      Family History   Problem Relation Name Age of Onset    Diabetes Mother      Stroke Father      Hypertension Father      Hypertension Other Family History       No Known Allergies       Occupation:     Review of Systems   Constitutional:  Negative for activity change and fever.   HENT:  Negative for congestion.    Respiratory:  Negative for cough, shortness of breath and wheezing.    Cardiovascular:  Negative for chest pain and leg swelling.   Gastrointestinal:  Negative for abdominal pain, constipation, nausea and vomiting.   Endocrine: Negative for cold intolerance.   Genitourinary:  Negative for dysuria, hematuria and urgency.   Neurological:  Negative for dizziness, speech difficulty, weakness and numbness.   Psychiatric/Behavioral:  Negative for self-injury and suicidal ideas.        Objective   Visit Vitals  /82 (BP Location: Left arm, Patient Position: Sitting, BP Cuff Size: Adult)   Pulse 80   Ht 1.702 m (5' 7\") "   Wt 103 kg (228 lb)   SpO2 96%   BMI 35.71 kg/m²   Smoking Status Never   BSA 2.21 m²      Physical Exam  Constitutional:       Appearance: Normal appearance.   HENT:      Head: Normocephalic and atraumatic.      Nose: Nose normal.      Mouth/Throat:      Mouth: Mucous membranes are moist.   Eyes:      Conjunctiva/sclera: Conjunctivae normal.      Pupils: Pupils are equal, round, and reactive to light.   Cardiovascular:      Rate and Rhythm: Normal rate and regular rhythm.      Pulses: Normal pulses.      Heart sounds: Normal heart sounds.   Pulmonary:      Effort: Pulmonary effort is normal.      Breath sounds: Normal breath sounds.   Musculoskeletal:         General: Normal range of motion.      Cervical back: Neck supple.   Skin:     General: Skin is warm.   Neurological:      General: No focal deficit present.      Mental Status: He is alert and oriented to person, place, and time.   Psychiatric:         Mood and Affect: Mood normal.         Behavior: Behavior normal.         Thought Content: Thought content normal.         Judgment: Judgment normal.         Assessment/Plan   Diagnoses and all orders for this visit:  Benign essential hypertension  Hyperlipidemia, unspecified hyperlipidemia type  Type 2 diabetes mellitus without complication, with long-term current use of insulin (Multi)

## 2024-07-12 ENCOUNTER — APPOINTMENT (OUTPATIENT)
Dept: PHARMACY | Facility: HOSPITAL | Age: 59
End: 2024-07-12
Payer: COMMERCIAL

## 2024-07-12 DIAGNOSIS — E11.22 TYPE 2 DIABETES MELLITUS WITH STAGE 3A CHRONIC KIDNEY DISEASE, WITHOUT LONG-TERM CURRENT USE OF INSULIN (MULTI): ICD-10-CM

## 2024-07-12 DIAGNOSIS — N18.31 TYPE 2 DIABETES MELLITUS WITH STAGE 3A CHRONIC KIDNEY DISEASE, WITHOUT LONG-TERM CURRENT USE OF INSULIN (MULTI): ICD-10-CM

## 2024-07-12 RX ORDER — TIRZEPATIDE 2.5 MG/.5ML
2.5 INJECTION, SOLUTION SUBCUTANEOUS
Qty: 2 ML | Refills: 0 | Status: SHIPPED | OUTPATIENT
Start: 2024-07-14

## 2024-07-12 RX ORDER — INSULIN GLARGINE 100 [IU]/ML
40 INJECTION, SOLUTION SUBCUTANEOUS DAILY
Qty: 45 ML | Refills: 0 | Status: SHIPPED | OUTPATIENT
Start: 2024-07-12

## 2024-07-12 ASSESSMENT — ENCOUNTER SYMPTOMS: BLURRED VISION: 0

## 2024-07-12 NOTE — PROGRESS NOTES
"Pharmacist Clinic: Diabetes Management  Babak Forte is a 59 y.o. male who presents for a follow-up evaluation of his Type 2 diabetes mellitus.   Referring Provider: Cassy Dean* --> Cassy Dean MD    Diabetes  He presents for his follow-up diabetic visit. He has type 2 diabetes mellitus. His disease course has been worsening. Associated symptoms include foot paresthesias and polyuria. Pertinent negatives for diabetes include no blurred vision and no chest pain. There are no hypoglycemic complications. Diabetic complications include nephropathy and peripheral neuropathy.   Since last visit, increased Lantus to 36 units daily and reduced metformin XR to 1000mg daily for reduced renal function. Yesterday established with new PCP. Blood sugars remain elevated. No missed dosing. Nonadherent to diet modifications. Interested in trying alternate GLP1a to control blood sugar (previous N/V on Trulicity). He drinks beer on the weekends.   C/o urinating at night still, incomplete emptying.     No symptoms of high or low sugars. Awakes to urinate 3x (down from 6x). Drinks ~50 ounces water daily. Works until 2-3 AM.  Discussed CGM - not interested, prefers fingerstick - has one glucometer for work and one for home.    No Known Allergies    LAB REVIEW   Glucose (mg/dL)   Date Value   05/04/2024 138 (H)   02/01/2024 178 (H)   09/16/2023 143 (H)   01/15/2023 176 (H)   01/13/2023 163 (H)     Hemoglobin A1C (%)   Date Value   05/04/2024 10.1 (H)   02/01/2024 8.9 (H)   09/16/2023 8.9 (A)   01/09/2023 8.4 (A)   07/08/2022 8.6 (A)     Urea Nitrogen (mg/dL)   Date Value   05/04/2024 34 (H)   02/01/2024 20   09/16/2023 15   01/15/2023 13   01/13/2023 16     Creatinine (mg/dL)   Date Value   05/04/2024 1.99 (H)   02/01/2024 1.82 (H)   09/16/2023 1.69 (H)   01/15/2023 1.51 (H)   01/13/2023 1.43 (H)     No results found for: \"ALBUR\", \"[id number]\"  Lab Results   Component Value Date    CHOL 141 02/01/2024    CHOL " 124 09/16/2023    CHOL 157 01/09/2023     Lab Results   Component Value Date    HDL 27.7 02/01/2024    HDL 30.9 (A) 09/16/2023    HDL 31.1 (A) 01/09/2023     Lab Results   Component Value Date    LDLCALC 56 02/01/2024     Lab Results   Component Value Date    TRIG 285 (H) 02/01/2024    TRIG 305 (H) 01/09/2023    TRIG 79 11/09/2020     The 10-year ASCVD risk score (Susy RICHARDSON, et al., 2019) is: 22.4%    Values used to calculate the score:      Age: 59 years      Sex: Male      Is Non- : Yes      Diabetic: Yes      Tobacco smoker: No      Systolic Blood Pressure: 118 mmHg      Is BP treated: Yes      HDL Cholesterol: 27.7 mg/dL      Total Cholesterol: 141 mg/dL     DIABETES ASSESSMENT  CURRENT PHARMACOTHERAPY  - Farxiga 10mg once daily   - Metformin XR 1000mg once daily   - Lantus 36 units once daily     SECONDARY PREVENTION  - Statin? Yes  - ACE-I/ARB? Yes  - Aspirin? Yes    HISTORICAL PHARMACOTHERAPY  - Glyburide (weight gain, renal)  - Trulicity (N/V)    SMBG MEASUREMENTS  Patient is using: glucometer once daily, not consistent  The patient is currently checking the blood glucose 1-2 times per day.  Reported range 180-300  Patient does not report symptoms of hypoglycemia. Patient denies dizziness, jitteriness, and sweating.  Hypoglycemia awareness: Never had low incident, reviewed s/s  Patient does report symptoms of hyperglycemia. Patient reports excessive thirst and polyuria.    ADHERENCE/AFFORDABILITY  - No cost barriers  - Adherence barriers: schedule    Assessment/Plan   Problem List Items Addressed This Visit    None      Patients diabetes is poorly controlled with most recent A1c of 10.1% (Goal < 7%).   Increase Lantus to 40 units once daily   Encouraged efforts for developing routine for medication administration; opt for protein + veg versus simple carbohydrates, meal plan when you have time to avoid fast food with busy schedule  Initiate Mounjaro 2.5mg once weekly  PMH GI s/e with  Trulicity. Titrate slowly. Patient drinks alcohol on the weekends, advised to limit to 1 drink/day and discussed risks of pancreatitis he is agreeable   Discontinue metformin XR (due to CKD)  Goal will be to optimize insulin dosing for BG control and to discontinue metformin to reduce risk of adverse effects in CKD3 (eGFR 38) -  anticipate to stop metformin at follow up pending BG review  Continue Farxiga 10mg once daily (take in the morning to reduce nighttime urination)  Counseled on med MOA, administration, side effects, monitoring    Blood sugar goals  - Fastin - 130 mg/dL  - 2 hours after meal: less than 180 mg/dL  - A1c: less than 7%     PharmD Follow-up:  09:30   PCP Follow-up: 24    Notify your healthcare provider if you have any of the following:  -Diarrhea or vomiting for more than six hours  -Blood sugar >300 mg/dL more than once  -Low blood sugar (<70 mg/dL)  -Questions about your medications    Thank you,  Kassie Hammonds, PharmD    Continue all meds under the continuation of care with the referring provider and clinical pharmacy team.  Verbal consent to manage patient's drug therapy was obtained. They were informed they may decline to participate or withdraw from participation in pharmacy services at any time.

## 2024-07-12 NOTE — Clinical Note
Starting Mounjaro 2.5mg weekly and stopping metformin due to CKD. Lantus increased to 40 units daily. Thank you

## 2024-07-19 ENCOUNTER — APPOINTMENT (OUTPATIENT)
Dept: PRIMARY CARE | Facility: CLINIC | Age: 59
End: 2024-07-19
Payer: COMMERCIAL

## 2024-07-31 ASSESSMENT — ENCOUNTER SYMPTOMS
HEMATURIA: 0
CONSTIPATION: 0
SPEECH DIFFICULTY: 0
NAUSEA: 0
FEVER: 0
WHEEZING: 0
ABDOMINAL PAIN: 0
DYSURIA: 0
DIZZINESS: 0
SHORTNESS OF BREATH: 0
NUMBNESS: 0
ACTIVITY CHANGE: 0
VOMITING: 0
COUGH: 0
WEAKNESS: 0

## 2024-08-02 ENCOUNTER — TELEMEDICINE (OUTPATIENT)
Dept: PHARMACY | Facility: HOSPITAL | Age: 59
End: 2024-08-02
Payer: COMMERCIAL

## 2024-08-02 ENCOUNTER — APPOINTMENT (OUTPATIENT)
Dept: PHARMACY | Facility: HOSPITAL | Age: 59
End: 2024-08-02
Payer: COMMERCIAL

## 2024-08-02 DIAGNOSIS — E11.22 TYPE 2 DIABETES MELLITUS WITH STAGE 3A CHRONIC KIDNEY DISEASE, WITHOUT LONG-TERM CURRENT USE OF INSULIN (MULTI): ICD-10-CM

## 2024-08-02 DIAGNOSIS — N18.31 TYPE 2 DIABETES MELLITUS WITH STAGE 3A CHRONIC KIDNEY DISEASE, WITHOUT LONG-TERM CURRENT USE OF INSULIN (MULTI): ICD-10-CM

## 2024-08-02 RX ORDER — TIRZEPATIDE 5 MG/.5ML
5 INJECTION, SOLUTION SUBCUTANEOUS
Qty: 6 ML | Refills: 0 | Status: SHIPPED | OUTPATIENT
Start: 2024-08-04

## 2024-08-02 ASSESSMENT — ENCOUNTER SYMPTOMS: BLURRED VISION: 0

## 2024-08-02 NOTE — PROGRESS NOTES
Pharmacist Clinic: Diabetes Management  Babak Forte is a 59 y.o. male who presents for a follow-up evaluation of his Type 2 diabetes mellitus.   Referring Provider: Cassy Dean*     Diabetes  He presents for his follow-up diabetic visit. He has type 2 diabetes mellitus. His disease course has been worsening. Associated symptoms include foot paresthesias and polyuria. Pertinent negatives for diabetes include no blurred vision and no chest pain. There are no hypoglycemic complications. Diabetic complications include nephropathy and peripheral neuropathy. Risk factors for coronary artery disease include obesity, male sex, diabetes mellitus, dyslipidemia and hypertension. He is compliant with treatment all of the time. His weight is stable. His home blood glucose trend is decreasing steadily. An ACE inhibitor/angiotensin II receptor blocker is being taken.   Since last visit, increased Lantus to 40 units daily, started Mounjaro, and was to stop metformin (d/t renal function) however he continued taking at 1000mg daily. Reports blood sugars significantly improved, checking in evening, reported range . No lows < 70, denies hypoglycemia symptoms. Notes minimal effect on appetite. Denies GI side effects. He reports reduction in alcohol use, had 2 beers over weekend.      Awakes to urinate 3x (down from 6x). Drinks ~50 ounces water daily. Works until 2-3 AM.  Discussed CGM - not interested, prefers fingerstick - has one glucometer for work and one for home.    No Known Allergies    LAB REVIEW   Glucose (mg/dL)   Date Value   05/04/2024 138 (H)   02/01/2024 178 (H)   09/16/2023 143 (H)   01/15/2023 176 (H)   01/13/2023 163 (H)     Hemoglobin A1C (%)   Date Value   05/04/2024 10.1 (H)   02/01/2024 8.9 (H)   09/16/2023 8.9 (A)   01/09/2023 8.4 (A)   07/08/2022 8.6 (A)     Urea Nitrogen (mg/dL)   Date Value   05/04/2024 34 (H)   02/01/2024 20   09/16/2023 15   01/15/2023 13   01/13/2023 16     Creatinine  "(mg/dL)   Date Value   05/04/2024 1.99 (H)   02/01/2024 1.82 (H)   09/16/2023 1.69 (H)   01/15/2023 1.51 (H)   01/13/2023 1.43 (H)     No results found for: \"ALBUR\", \"[id number]\"  Lab Results   Component Value Date    CHOL 141 02/01/2024    CHOL 124 09/16/2023    CHOL 157 01/09/2023     Lab Results   Component Value Date    HDL 27.7 02/01/2024    HDL 30.9 (A) 09/16/2023    HDL 31.1 (A) 01/09/2023     Lab Results   Component Value Date    LDLCALC 56 02/01/2024     Lab Results   Component Value Date    TRIG 285 (H) 02/01/2024    TRIG 305 (H) 01/09/2023    TRIG 79 11/09/2020     The 10-year ASCVD risk score (Susy RICHARDSON, et al., 2019) is: 22.4%    Values used to calculate the score:      Age: 59 years      Sex: Male      Is Non- : Yes      Diabetic: Yes      Tobacco smoker: No      Systolic Blood Pressure: 118 mmHg      Is BP treated: Yes      HDL Cholesterol: 27.7 mg/dL      Total Cholesterol: 141 mg/dL     DIABETES ASSESSMENT  CURRENT PHARMACOTHERAPY  - Farxiga 10mg once daily   - Metformin XR 1000mg once daily   - Lantus 40 units once daily   - Mounjaro 2.5mg weekly (Sunday)    SECONDARY PREVENTION  - Statin? Yes  - ACE-I/ARB? Yes  - Aspirin? Yes    HISTORICAL PHARMACOTHERAPY  - Glyburide (weight gain, renal)  - Trulicity (N/V)    SMBG MEASUREMENTS  Patient is using: glucometer once daily, not consistent  The patient is currently checking the blood glucose 1-2 times per day.  Postprandial range   Patient does not report symptoms of hypoglycemia. Patient denies dizziness, jitteriness, and sweating.  Hypoglycemia awareness: Never had low incident, reviewed s/s  Patient does report symptoms of hyperglycemia. Patient reports excessive thirst and polyuria.    ADHERENCE/AFFORDABILITY  - No cost barriers  - Adherence barriers: schedule    Assessment/Plan   Problem List Items Addressed This Visit       Type 2 diabetes mellitus with stage 3a chronic kidney disease, without long-term current use of " insulin (Multi)    Relevant Medications    tirzepatide (Mounjaro) 5 mg/0.5 mL pen injector (Start on 2024)    blood sugar diagnostic strip    Other Relevant Orders    Follow Up In Clinical Pharmacy       Patients diabetes is poorly controlled with most recent A1c of 10.1% (Goal < 7%). Improving per home readings. Due for repeat A1c after .  Decrease Lantus to 36 units once daily   Increase Mounjaro to 5mg once weekly  PMH GI s/e with Trulicity. Titrate slowly. Patient drinks alcohol on the weekends, advised to limit to 1 drink/day and discussed risks of pancreatitis he is agreeable   Discontinue metformin XR (due to CKD)  Continue Farxiga 10mg once daily (take in the morning to reduce nighttime urination)  2. Mounjaro Education:   Counseled patient on Mounjaro MOA, expectations, side effects, duration of therapy, administration, and monitoring parameters.  Provided detailed dosing and administration counseling to ensure proper technique.   Reviewed Mounjaro titration schedule, starting with 2.5 mg once weekly to a goal of 15 mg once weekly if tolerated  Counseled patient on the benefits of GLP-1ra glycemic control and weight loss  Reviewed storage requirements of Mounjaro when not in use, and when to administer the medication if a dose is missed.  Advised patient that they may experience improved satiety after meals and portion sizes of meals may be reduced as doses of Mounjaro increase.     Blood sugar goals  - Fastin - 130 mg/dL  - 2 hours after meal: less than 180 mg/dL  - A1c: less than 7%     PharmD Follow-up: 24  PCP Follow-up: 24    Notify your healthcare provider if you have any of the following:  -Diarrhea or vomiting for more than six hours  -Blood sugar >300 mg/dL more than once  -Low blood sugar (<70 mg/dL)  -Questions about your medications    Thank you,  Kassie Hammonds, PharmD    Continue all meds under the continuation of care with the referring provider and clinical pharmacy  team.  Verbal consent to manage patient's drug therapy was obtained. They were informed they may decline to participate or withdraw from participation in pharmacy services at any time.

## 2024-08-23 ENCOUNTER — APPOINTMENT (OUTPATIENT)
Dept: PHARMACY | Facility: HOSPITAL | Age: 59
End: 2024-08-23
Payer: COMMERCIAL

## 2024-08-23 DIAGNOSIS — I10 BENIGN ESSENTIAL HYPERTENSION: ICD-10-CM

## 2024-08-23 DIAGNOSIS — E11.22 TYPE 2 DIABETES MELLITUS WITH STAGE 3A CHRONIC KIDNEY DISEASE, WITHOUT LONG-TERM CURRENT USE OF INSULIN (MULTI): ICD-10-CM

## 2024-08-23 DIAGNOSIS — N18.31 TYPE 2 DIABETES MELLITUS WITH STAGE 3A CHRONIC KIDNEY DISEASE, WITHOUT LONG-TERM CURRENT USE OF INSULIN (MULTI): ICD-10-CM

## 2024-08-23 RX ORDER — INSULIN GLARGINE 100 [IU]/ML
32 INJECTION, SOLUTION SUBCUTANEOUS DAILY
Qty: 45 ML | Refills: 0 | Status: SHIPPED | OUTPATIENT
Start: 2024-08-23

## 2024-08-23 RX ORDER — LISINOPRIL 40 MG/1
40 TABLET ORAL DAILY
Qty: 90 TABLET | Refills: 3 | Status: SHIPPED | OUTPATIENT
Start: 2024-08-23

## 2024-08-23 ASSESSMENT — ENCOUNTER SYMPTOMS: BLURRED VISION: 0

## 2024-08-23 NOTE — PROGRESS NOTES
Pharmacist Clinic: Diabetes Management  Babak Forte is a 59 y.o. male who presents for a follow-up evaluation of his Type 2 diabetes mellitus.   Referring Provider: Cassy Dean*     Diabetes  He presents for his follow-up diabetic visit. He has type 2 diabetes mellitus. His disease course has been worsening. Associated symptoms include foot paresthesias and polyuria. Pertinent negatives for diabetes include no blurred vision and no chest pain. There are no hypoglycemic complications. Diabetic complications include nephropathy and peripheral neuropathy. Risk factors for coronary artery disease include obesity, male sex, diabetes mellitus, dyslipidemia and hypertension. He is compliant with treatment all of the time. His weight is stable. His home blood glucose trend is decreasing steadily. An ACE inhibitor/angiotensin II receptor blocker is being taken.   Since last visit, Mounjaro increased to 5mg weekly , Lantus reduced to 36 units and stopped metformin (d/t renal function). Denies GI side effects other than mild constipation, still regular BM. Reports blood sugars significantly improved, checking during the day and evening. No lows < 70, denies hypoglycemia symptoms. Notes minimal effect on appetite. He reports continued reduction in alcohol use, limiting to 2 beers on weekends.     Awakes to urinate 3x (down from 6x). Drinks ~50 ounces water daily. Works until 2-3 AM.  Discussed CGM - not interested, prefers fingerstick - has one glucometer for work and one for home.    No Known Allergies    LAB REVIEW   Glucose (mg/dL)   Date Value   05/04/2024 138 (H)   02/01/2024 178 (H)   09/16/2023 143 (H)   01/15/2023 176 (H)   01/13/2023 163 (H)     Hemoglobin A1C (%)   Date Value   05/04/2024 10.1 (H)   02/01/2024 8.9 (H)   09/16/2023 8.9 (A)   01/09/2023 8.4 (A)   07/08/2022 8.6 (A)     Urea Nitrogen (mg/dL)   Date Value   05/04/2024 34 (H)   02/01/2024 20   09/16/2023 15   01/15/2023 13   01/13/2023 16  "    Creatinine (mg/dL)   Date Value   05/04/2024 1.99 (H)   02/01/2024 1.82 (H)   09/16/2023 1.69 (H)   01/15/2023 1.51 (H)   01/13/2023 1.43 (H)     No results found for: \"ALBUR\", \"[id number]\"  Lab Results   Component Value Date    CHOL 141 02/01/2024    CHOL 124 09/16/2023    CHOL 157 01/09/2023     Lab Results   Component Value Date    HDL 27.7 02/01/2024    HDL 30.9 (A) 09/16/2023    HDL 31.1 (A) 01/09/2023     Lab Results   Component Value Date    LDLCALC 56 02/01/2024     Lab Results   Component Value Date    TRIG 285 (H) 02/01/2024    TRIG 305 (H) 01/09/2023    TRIG 79 11/09/2020     The 10-year ASCVD risk score (Susy RICHARDSON, et al., 2019) is: 22.4%    Values used to calculate the score:      Age: 59 years      Sex: Male      Is Non- : Yes      Diabetic: Yes      Tobacco smoker: No      Systolic Blood Pressure: 118 mmHg      Is BP treated: Yes      HDL Cholesterol: 27.7 mg/dL      Total Cholesterol: 141 mg/dL     DIABETES ASSESSMENT  CURRENT PHARMACOTHERAPY  - Farxiga 10mg once daily   - Lantus 36 units once daily   - Mounjaro 5mg weekly (Sunday)    SECONDARY PREVENTION  - Statin? Yes  - ACE-I/ARB? Yes  - Aspirin? Yes    HISTORICAL PHARMACOTHERAPY  - Glyburide (weight gain, renal)  - Trulicity (N/V)  - Metformin (renal function)    SMBG MEASUREMENTS  Patient is using: glucometer once daily, not consistent  The patient is currently checking the blood glucose 1-2 times per day.  Highest 176  Avg   Patient does not report symptoms of hypoglycemia. Patient denies dizziness, jitteriness, and sweating.  Hypoglycemia awareness: Never had low incident, reviewed s/s  Patient does report symptoms of hyperglycemia. Patient reports excessive thirst and polyuria.    ADHERENCE/AFFORDABILITY  - No cost barriers  - Adherence barriers: schedule    Assessment/Plan   Problem List Items Addressed This Visit       Benign essential hypertension    Relevant Medications    lisinopril 40 mg tablet    Other " Relevant Orders    Follow Up In Clinical Pharmacy    Type 2 diabetes mellitus with stage 3a chronic kidney disease, without long-term current use of insulin (Multi)    Relevant Medications    insulin glargine (Lantus Solostar U-100 Insulin) 100 unit/mL (3 mL) pen    Other Relevant Orders    Hemoglobin A1c    Comprehensive metabolic panel    Albumin-Creatinine Ratio, Urine Random    Follow Up In Clinical Pharmacy       Patients diabetes is poorly controlled with most recent A1c of 10.1% (Goal < 7%). Improving per home readings. Due for repeat A1c after .  Decrease Lantus to 32 units once daily   Reported blood sugars well controlled would like to reduce insulin requirements to limit hypoglycemia and weight gain  Continue Mounjaro 5mg once weekly  PMH GI s/e with Trulicity. Titrate slowly. Patient drinks alcohol on the weekends, advised to limit to 1 drink/day and discussed risks of pancreatitis he is agreeable   Continue Farxiga 10mg once daily (take in the morning to reduce nighttime urination)  A1c, CMP, urine albumin ordered   Secondary prevention: refilled lisinopril per patient request   2. Mounjaro Education:   Counseled patient on Mounjaro MOA, expectations, side effects, duration of therapy, administration, and monitoring parameters.  Provided detailed dosing and administration counseling to ensure proper technique.   Reviewed Mounjaro titration schedule, starting with 2.5 mg once weekly to a goal of 15 mg once weekly if tolerated  Counseled patient on the benefits of GLP-1ra glycemic control and weight loss  Reviewed storage requirements of Mounjaro when not in use, and when to administer the medication if a dose is missed.  Advised patient that they may experience improved satiety after meals and portion sizes of meals may be reduced as doses of Mounjaro increase.     Blood sugar goals  - Fastin - 130 mg/dL  - 2 hours after meal: less than 180 mg/dL  - A1c: less than 7%     PharmD Follow-up:   9:30 AM   PCP Follow-up: 9/11/24    Notify your healthcare provider if you have any of the following:  -Diarrhea or vomiting for more than six hours  -Blood sugar >300 mg/dL more than once  -Low blood sugar (<70 mg/dL)  -Questions about your medications    Thank you,  Kassie Hammonds, PharmD    Continue all meds under the continuation of care with the referring provider and clinical pharmacy team.  Verbal consent to manage patient's drug therapy was obtained. They were informed they may decline to participate or withdraw from participation in pharmacy services at any time.

## 2024-08-26 ENCOUNTER — LAB (OUTPATIENT)
Dept: LAB | Facility: LAB | Age: 59
End: 2024-08-26
Payer: COMMERCIAL

## 2024-08-26 DIAGNOSIS — N18.31 TYPE 2 DIABETES MELLITUS WITH STAGE 3A CHRONIC KIDNEY DISEASE, WITHOUT LONG-TERM CURRENT USE OF INSULIN (MULTI): ICD-10-CM

## 2024-08-26 DIAGNOSIS — E11.22 TYPE 2 DIABETES MELLITUS WITH STAGE 3A CHRONIC KIDNEY DISEASE, WITHOUT LONG-TERM CURRENT USE OF INSULIN (MULTI): ICD-10-CM

## 2024-08-26 LAB
ALBUMIN SERPL BCP-MCNC: 4 G/DL (ref 3.4–5)
ALP SERPL-CCNC: 65 U/L (ref 33–136)
ALT SERPL W P-5'-P-CCNC: 36 U/L (ref 10–52)
ANION GAP SERPL CALC-SCNC: 13 MMOL/L (ref 10–20)
AST SERPL W P-5'-P-CCNC: 26 U/L (ref 9–39)
BILIRUB SERPL-MCNC: 0.7 MG/DL (ref 0–1.2)
BUN SERPL-MCNC: 19 MG/DL (ref 6–23)
CALCIUM SERPL-MCNC: 9 MG/DL (ref 8.6–10.6)
CHLORIDE SERPL-SCNC: 107 MMOL/L (ref 98–107)
CO2 SERPL-SCNC: 25 MMOL/L (ref 21–32)
CREAT SERPL-MCNC: 1.76 MG/DL (ref 0.5–1.3)
CREAT UR-MCNC: 182.4 MG/DL (ref 20–370)
EGFRCR SERPLBLD CKD-EPI 2021: 44 ML/MIN/1.73M*2
EST. AVERAGE GLUCOSE BLD GHB EST-MCNC: 235 MG/DL
GLUCOSE SERPL-MCNC: 134 MG/DL (ref 74–99)
HBA1C MFR BLD: 9.8 %
MICROALBUMIN UR-MCNC: 20.4 MG/L
MICROALBUMIN/CREAT UR: 11.2 UG/MG CREAT
POTASSIUM SERPL-SCNC: 4.5 MMOL/L (ref 3.5–5.3)
PROT SERPL-MCNC: 6.8 G/DL (ref 6.4–8.2)
SODIUM SERPL-SCNC: 140 MMOL/L (ref 136–145)

## 2024-08-26 PROCEDURE — 83036 HEMOGLOBIN GLYCOSYLATED A1C: CPT

## 2024-08-26 PROCEDURE — 80053 COMPREHEN METABOLIC PANEL: CPT

## 2024-08-26 PROCEDURE — 82570 ASSAY OF URINE CREATININE: CPT

## 2024-08-26 PROCEDURE — 82043 UR ALBUMIN QUANTITATIVE: CPT

## 2024-08-26 PROCEDURE — 36415 COLL VENOUS BLD VENIPUNCTURE: CPT

## 2024-09-11 ENCOUNTER — APPOINTMENT (OUTPATIENT)
Dept: PRIMARY CARE | Facility: CLINIC | Age: 59
End: 2024-09-11
Payer: COMMERCIAL

## 2024-09-11 VITALS
HEART RATE: 68 BPM | BODY MASS INDEX: 36.96 KG/M2 | SYSTOLIC BLOOD PRESSURE: 123 MMHG | WEIGHT: 236 LBS | DIASTOLIC BLOOD PRESSURE: 73 MMHG

## 2024-09-11 DIAGNOSIS — Z00.00 HEALTH CARE MAINTENANCE: ICD-10-CM

## 2024-09-11 DIAGNOSIS — E11.9 TYPE 2 DIABETES MELLITUS WITHOUT COMPLICATION, WITH LONG-TERM CURRENT USE OF INSULIN (MULTI): Primary | ICD-10-CM

## 2024-09-11 DIAGNOSIS — I10 BENIGN ESSENTIAL HYPERTENSION: ICD-10-CM

## 2024-09-11 DIAGNOSIS — Z79.4 TYPE 2 DIABETES MELLITUS WITHOUT COMPLICATION, WITH LONG-TERM CURRENT USE OF INSULIN (MULTI): Primary | ICD-10-CM

## 2024-09-11 DIAGNOSIS — E78.5 HYPERLIPIDEMIA, UNSPECIFIED HYPERLIPIDEMIA TYPE: ICD-10-CM

## 2024-09-11 PROCEDURE — 3061F NEG MICROALBUMINURIA REV: CPT | Performed by: STUDENT IN AN ORGANIZED HEALTH CARE EDUCATION/TRAINING PROGRAM

## 2024-09-11 PROCEDURE — 4010F ACE/ARB THERAPY RXD/TAKEN: CPT | Performed by: STUDENT IN AN ORGANIZED HEALTH CARE EDUCATION/TRAINING PROGRAM

## 2024-09-11 PROCEDURE — 3078F DIAST BP <80 MM HG: CPT | Performed by: STUDENT IN AN ORGANIZED HEALTH CARE EDUCATION/TRAINING PROGRAM

## 2024-09-11 PROCEDURE — 3046F HEMOGLOBIN A1C LEVEL >9.0%: CPT | Performed by: STUDENT IN AN ORGANIZED HEALTH CARE EDUCATION/TRAINING PROGRAM

## 2024-09-11 PROCEDURE — 3048F LDL-C <100 MG/DL: CPT | Performed by: STUDENT IN AN ORGANIZED HEALTH CARE EDUCATION/TRAINING PROGRAM

## 2024-09-11 PROCEDURE — 3074F SYST BP LT 130 MM HG: CPT | Performed by: STUDENT IN AN ORGANIZED HEALTH CARE EDUCATION/TRAINING PROGRAM

## 2024-09-11 PROCEDURE — 99214 OFFICE O/P EST MOD 30 MIN: CPT | Performed by: STUDENT IN AN ORGANIZED HEALTH CARE EDUCATION/TRAINING PROGRAM

## 2024-09-11 ASSESSMENT — ENCOUNTER SYMPTOMS
WHEEZING: 0
ABDOMINAL PAIN: 0
SPEECH DIFFICULTY: 0
CONSTIPATION: 0
NUMBNESS: 0
HEMATURIA: 0
NAUSEA: 0
VOMITING: 0
SHORTNESS OF BREATH: 0
WEAKNESS: 0
ACTIVITY CHANGE: 0
FEVER: 0
COUGH: 0
DIZZINESS: 0
DYSURIA: 0

## 2024-09-11 NOTE — PROGRESS NOTES
Subjective   Patient ID: Babak Forte is a 59 y.o. male who presents for Follow-up.  HPI patient is here for a follow-up.  No concerns endorsed    1.hyperlipidemia-atorvastatin 40  #2 hypertension- hydrochlorothiazide 25; lisinopril 40  #3 diabetes   Following up with pharmacy   -Farxiga 10mg once daily   - Lantus 36 units once daily   - Mounjaro 5mg weekly (Sunday)  Recent A1c elevated but this was him soon after initiation of Mounjaro 5 mg    Advised to increase Mounjaro to 7.5, continue follow-up with pharmacy    #4 CKD stable  On lisinopril and Farxiga    See me in office 3 months for follow-up and physical exam [labs 1 week prior]  Past Medical History:   Diagnosis Date    Personal history of other diseases of the circulatory system     History of diastolic dysfunction      Past Surgical History:   Procedure Laterality Date    ANKLE SURGERY  08/23/2013    Ankle Surgery    COLONOSCOPY  01/04/2016    Complete Colonoscopy      Family History   Problem Relation Name Age of Onset    Diabetes Mother      Stroke Father      Hypertension Father      Hypertension Other Family History       No Known Allergies       Occupation:     Review of Systems   Constitutional:  Negative for activity change and fever.   HENT:  Negative for congestion.    Respiratory:  Negative for cough, shortness of breath and wheezing.    Cardiovascular:  Negative for chest pain and leg swelling.   Gastrointestinal:  Negative for abdominal pain, constipation, nausea and vomiting.   Endocrine: Negative for cold intolerance.   Genitourinary:  Negative for dysuria, hematuria and urgency.   Neurological:  Negative for dizziness, speech difficulty, weakness and numbness.   Psychiatric/Behavioral:  Negative for self-injury and suicidal ideas.        Objective   Visit Vitals  /73   Pulse 68   Wt 107 kg (236 lb)   BMI 36.96 kg/m²   Smoking Status Never   BSA 2.25 m²      Physical Exam  Constitutional:       Appearance: Normal appearance.   HENT:       Head: Normocephalic and atraumatic.      Nose: Nose normal.      Mouth/Throat:      Mouth: Mucous membranes are moist.   Eyes:      Conjunctiva/sclera: Conjunctivae normal.      Pupils: Pupils are equal, round, and reactive to light.   Cardiovascular:      Rate and Rhythm: Normal rate and regular rhythm.      Pulses: Normal pulses.      Heart sounds: Normal heart sounds.   Pulmonary:      Effort: Pulmonary effort is normal.      Breath sounds: Normal breath sounds.   Musculoskeletal:         General: Normal range of motion.      Cervical back: Neck supple.   Skin:     General: Skin is warm.   Neurological:      General: No focal deficit present.      Mental Status: He is alert and oriented to person, place, and time.   Psychiatric:         Mood and Affect: Mood normal.         Behavior: Behavior normal.         Thought Content: Thought content normal.         Judgment: Judgment normal.         Assessment/Plan   Diagnoses and all orders for this visit:  Type 2 diabetes mellitus without complication, with long-term current use of insulin (Multi)  -     Hemoglobin A1C; Future  -     Albumin-Creatinine Ratio, Urine Random; Future  -     Creatinine, Urine Random; Future  -     Lipid Panel; Future  -     Comprehensive Metabolic Panel; Future  -     CBC; Future  -     TSH with reflex to Free T4 if abnormal; Future  Health care maintenance  -     PSA; Future  Benign essential hypertension  Hyperlipidemia, unspecified hyperlipidemia type

## 2024-09-20 ENCOUNTER — APPOINTMENT (OUTPATIENT)
Dept: PHARMACY | Facility: HOSPITAL | Age: 59
End: 2024-09-20
Payer: COMMERCIAL

## 2024-09-20 DIAGNOSIS — E11.22 TYPE 2 DIABETES MELLITUS WITH STAGE 3A CHRONIC KIDNEY DISEASE, WITHOUT LONG-TERM CURRENT USE OF INSULIN (MULTI): ICD-10-CM

## 2024-09-20 DIAGNOSIS — I10 BENIGN ESSENTIAL HYPERTENSION: ICD-10-CM

## 2024-09-20 DIAGNOSIS — N18.31 TYPE 2 DIABETES MELLITUS WITH STAGE 3A CHRONIC KIDNEY DISEASE, WITHOUT LONG-TERM CURRENT USE OF INSULIN (MULTI): ICD-10-CM

## 2024-09-20 RX ORDER — TIRZEPATIDE 7.5 MG/.5ML
7.5 INJECTION, SOLUTION SUBCUTANEOUS WEEKLY
Qty: 2 ML | Refills: 1 | Status: SHIPPED | OUTPATIENT
Start: 2024-09-20

## 2024-09-20 ASSESSMENT — ENCOUNTER SYMPTOMS: BLURRED VISION: 0

## 2024-09-20 NOTE — PROGRESS NOTES
Pharmacist Clinic: Diabetes Management  Babak Forte is a 59 y.o. male who presents for a follow-up evaluation of his Type 2 diabetes mellitus.   Referring Provider: Cassy Dean*     Diabetes  He presents for his follow-up diabetic visit. He has type 2 diabetes mellitus. His disease course has been improving. Associated symptoms include foot paresthesias and polyuria. Pertinent negatives for diabetes include no blurred vision and no chest pain. There are no hypoglycemic complications. Diabetic complications include nephropathy and peripheral neuropathy. Risk factors for coronary artery disease include obesity, male sex, diabetes mellitus, dyslipidemia and hypertension. He is compliant with treatment all of the time. His weight is stable. His home blood glucose trend is decreasing steadily. An ACE inhibitor/angiotensin II receptor blocker is being taken.   Since last visit, repeat A1c reduced from 10.1 to 9.8% (checked after one month on Mounjaro, per home BG anticipate lower A1c). Last visit maintained on Mounjaro 5mg weekly. Past month missed one dose of Lantus and one dose of Mounjaro which is an improvement in adherence. Denies GI side effects other than mild constipation, still regular BM. Reports blood sugars stable. Notes minimal effect on appetite. He reports continued reduction in alcohol use, limiting to 2 beers on weekends.     Works until 2-3 AM.  Discussed CGM - not interested, prefers fingerstick - has one glucometer for work and one for home.    No Known Allergies    LAB REVIEW   Glucose (mg/dL)   Date Value   08/26/2024 134 (H)   05/04/2024 138 (H)   02/01/2024 178 (H)     Hemoglobin A1C (%)   Date Value   08/26/2024 9.8 (H)   05/04/2024 10.1 (H)   02/01/2024 8.9 (H)     Urea Nitrogen (mg/dL)   Date Value   08/26/2024 19   05/04/2024 34 (H)   02/01/2024 20     Creatinine (mg/dL)   Date Value   08/26/2024 1.76 (H)   05/04/2024 1.99 (H)   02/01/2024 1.82 (H)     No results found for:  "\"ALBUR\", \"[id number]\"  Lab Results   Component Value Date    CHOL 141 02/01/2024    CHOL 124 09/16/2023    CHOL 157 01/09/2023     Lab Results   Component Value Date    HDL 27.7 02/01/2024    HDL 30.9 (A) 09/16/2023    HDL 31.1 (A) 01/09/2023     Lab Results   Component Value Date    LDLCALC 56 02/01/2024     Lab Results   Component Value Date    TRIG 285 (H) 02/01/2024    TRIG 305 (H) 01/09/2023    TRIG 79 11/09/2020     The 10-year ASCVD risk score (Susy RICHARDSON, et al., 2019) is: 24%    Values used to calculate the score:      Age: 59 years      Sex: Male      Is Non- : Yes      Diabetic: Yes      Tobacco smoker: No      Systolic Blood Pressure: 123 mmHg      Is BP treated: Yes      HDL Cholesterol: 27.7 mg/dL      Total Cholesterol: 141 mg/dL     DIABETES ASSESSMENT  CURRENT PHARMACOTHERAPY  - Farxiga 10mg once daily (AM)  - Lantus 32 units once daily (AM)  - Mounjaro 5mg weekly (Sunday)    SECONDARY PREVENTION  - Statin? Yes  - ACE-I/ARB? Yes  - Aspirin? Yes    HISTORICAL PHARMACOTHERAPY  - Glyburide (weight gain, renal)  - Trulicity (N/V)  - Metformin (renal function)    SMBG MEASUREMENTS  Patient is using: glucometer once daily at work (mid day postprandial), started checking FBG as well occasionally   The patient is currently checking the blood glucose 1-2 times per day.  Avg 135-145  No lows  < 70  1-2 times  > 200   Patient does not report symptoms of hypoglycemia. Patient denies dizziness, jitteriness, and sweating.  Hypoglycemia awareness: Never had low incident, reviewed s/s  Patient does report symptoms of hyperglycemia. Patient reports excessive thirst and polyuria.    ADHERENCE/AFFORDABILITY  - No cost barriers  - Adherence barriers: schedule    Assessment/Plan   Problem List Items Addressed This Visit       Benign essential hypertension    Type 2 diabetes mellitus with stage 3a chronic kidney disease, without long-term current use of insulin (Multi)     Patients diabetes is poorly " controlled with most recent A1c of 9.8% (Goal < 7%). Improving per home readings.   Decrease Lantus to 30 units once daily   Reported blood sugars well controlled would like to reduce insulin requirements to limit hypoglycemia and weight gain  Increase Mounjaro to 7.5mg once weekly  PMH GI s/e with Trulicity. Titrate slowly. Patient drinks alcohol on the weekends, advised to limit to 1 drink/day and discussed risks of pancreatitis he is agreeable   Continue Farxiga 10mg once daily     2. Mounjaro Education:   Counseled patient on Mounjaro MOA, expectations, side effects, duration of therapy, administration, and monitoring parameters.  Provided detailed dosing and administration counseling to ensure proper technique.   Reviewed Mounjaro titration schedule, starting with 2.5 mg once weekly to a goal of 15 mg once weekly if tolerated  Counseled patient on the benefits of GLP-1ra glycemic control and weight loss  Reviewed storage requirements of Mounjaro when not in use, and when to administer the medication if a dose is missed.  Advised patient that they may experience improved satiety after meals and portion sizes of meals may be reduced as doses of Mounjaro increase.     Blood sugar goals  - Fastin - 130 mg/dL  - 2 hours after meal: less than 180 mg/dL  - A1c: less than 7%     PharmD Follow-up: 10/25/24 0930  PCP Follow-up: 24    Notify your healthcare provider if you have any of the following:  -Diarrhea or vomiting for more than six hours  -Blood sugar >300 mg/dL more than once  -Low blood sugar (<70 mg/dL)  -Questions about your medications    Thank you,  Kassie Hammonds, PharmD    Continue all meds under the continuation of care with the referring provider and clinical pharmacy team.  Verbal consent to manage patient's drug therapy was obtained. They were informed they may decline to participate or withdraw from participation in pharmacy services at any time.

## 2024-10-25 ENCOUNTER — APPOINTMENT (OUTPATIENT)
Dept: PHARMACY | Facility: HOSPITAL | Age: 59
End: 2024-10-25
Payer: COMMERCIAL

## 2024-10-25 DIAGNOSIS — N18.31 TYPE 2 DIABETES MELLITUS WITH STAGE 3A CHRONIC KIDNEY DISEASE, WITHOUT LONG-TERM CURRENT USE OF INSULIN (MULTI): ICD-10-CM

## 2024-10-25 DIAGNOSIS — I10 BENIGN ESSENTIAL HYPERTENSION: ICD-10-CM

## 2024-10-25 DIAGNOSIS — E11.22 TYPE 2 DIABETES MELLITUS WITH STAGE 3A CHRONIC KIDNEY DISEASE, WITHOUT LONG-TERM CURRENT USE OF INSULIN (MULTI): ICD-10-CM

## 2024-10-25 RX ORDER — TIRZEPATIDE 10 MG/.5ML
10 INJECTION, SOLUTION SUBCUTANEOUS WEEKLY
Qty: 2 ML | Refills: 1 | Status: SHIPPED | OUTPATIENT
Start: 2024-10-25

## 2024-10-25 ASSESSMENT — ENCOUNTER SYMPTOMS: BLURRED VISION: 0

## 2024-10-25 NOTE — PROGRESS NOTES
Pharmacist Clinic: Diabetes Management  Babak Forte is a 59 y.o. male who presents for a follow-up evaluation of his Type 2 diabetes mellitus.   Referring Provider: Cassy Dean*     Diabetes  He presents for his follow-up diabetic visit. He has type 2 diabetes mellitus. His disease course has been improving. Associated symptoms include foot paresthesias and polyuria. Pertinent negatives for diabetes include no blurred vision and no chest pain. There are no hypoglycemic complications. Diabetic complications include nephropathy and peripheral neuropathy. Risk factors for coronary artery disease include obesity, male sex, diabetes mellitus, dyslipidemia and hypertension. He is compliant with treatment all of the time. His weight is stable. His home blood glucose trend is decreasing steadily. An ACE inhibitor/angiotensin II receptor blocker is being taken.   Since last visit, Mounjaro increased to 7.5mg once weekly. Denies GI side effects. No missed dosing of Mounjaro. Occasional missed Lantus dosing on Saturdays. No change in appetite or weight, reported home weight 240 lbs.  Eats late 2am [employer] often due to work schedule, endorses night time cravings, poor diet control in past month.  Infrequent blood sugar testing - started testing again this past week.    He reports continued reduction in alcohol use, limiting to 2 beers on weekends.     Works until 2-3 AM.  Discussed CGM - not interested, prefers fingerstick - has one glucometer for work and one for home.    No Known Allergies    LAB REVIEW   Glucose (mg/dL)   Date Value   08/26/2024 134 (H)   05/04/2024 138 (H)   02/01/2024 178 (H)     Hemoglobin A1C (%)   Date Value   08/26/2024 9.8 (H)   05/04/2024 10.1 (H)   02/01/2024 8.9 (H)     Urea Nitrogen (mg/dL)   Date Value   08/26/2024 19   05/04/2024 34 (H)   02/01/2024 20     Creatinine (mg/dL)   Date Value   08/26/2024 1.76 (H)   05/04/2024 1.99 (H)   02/01/2024 1.82 (H)     No results found for:  "\"ALBUR\", \"[id number]\"  Lab Results   Component Value Date    CHOL 141 02/01/2024    CHOL 124 09/16/2023    CHOL 157 01/09/2023     Lab Results   Component Value Date    HDL 27.7 02/01/2024    HDL 30.9 (A) 09/16/2023    HDL 31.1 (A) 01/09/2023     Lab Results   Component Value Date    LDLCALC 56 02/01/2024     Lab Results   Component Value Date    TRIG 285 (H) 02/01/2024    TRIG 305 (H) 01/09/2023    TRIG 79 11/09/2020     The 10-year ASCVD risk score (Susy RICHARDSON, et al., 2019) is: 24%    Values used to calculate the score:      Age: 59 years      Sex: Male      Is Non- : Yes      Diabetic: Yes      Tobacco smoker: No      Systolic Blood Pressure: 123 mmHg      Is BP treated: Yes      HDL Cholesterol: 27.7 mg/dL      Total Cholesterol: 141 mg/dL     DIABETES ASSESSMENT  CURRENT PHARMACOTHERAPY  - Farxiga 10mg once daily (AM)  - Lantus 32 units once daily (AM)  - Mounjaro 7.5mg weekly (Sunday)    SECONDARY PREVENTION  - Statin? Yes  - ACE-I/ARB? Yes  - Aspirin? Yes    HISTORICAL PHARMACOTHERAPY  - Glyburide (weight gain, renal)  - Trulicity (N/V)  - Metformin (renal function)    SMBG MEASUREMENTS  Patient is using: glucometer   The patient is currently checking the blood glucose a few times per week at work  Postprandial 120,147,150  No lows  < 70  No highs > 200  Patient does not report symptoms of hypoglycemia. Patient denies dizziness, jitteriness, and sweating.  Hypoglycemia awareness: Never had low incident, reviewed s/s  Patient does report symptoms of hyperglycemia. Patient reports excessive thirst and polyuria.    ADHERENCE/AFFORDABILITY  - No cost barriers  - Adherence barriers: schedule    Assessment/Plan   Problem List Items Addressed This Visit       Benign essential hypertension    Type 2 diabetes mellitus with stage 3a chronic kidney disease, without long-term current use of insulin (Multi)       Patients diabetes is poorly controlled with most recent A1c of 9.8% (Goal < 7%). " Improving per home readings.   Decrease Lantus to 30 units once daily   Reported blood sugars well controlled would like to reduce insulin requirements to limit hypoglycemia and weight gain  Increase Mounjaro to 10mg once weekly  Continue Farxiga 10mg once daily   Advised to test blood sugar once daily. Discussed alternating with checking in fasting state upon waking and 2 hours after eating. Record log for review at follow up.  Active lab orders     2. Mounjaro Education:   Counseled patient on Mounjaro MOA, expectations, side effects, duration of therapy, administration, and monitoring parameters.  Provided detailed dosing and administration counseling to ensure proper technique.   Reviewed Mounjaro titration schedule, starting with 2.5 mg once weekly to a goal of 15 mg once weekly if tolerated  Counseled patient on the benefits of GLP-1ra glycemic control and weight loss  Reviewed storage requirements of Mounjaro when not in use, and when to administer the medication if a dose is missed.  Advised patient that they may experience improved satiety after meals and portion sizes of meals may be reduced as doses of Mounjaro increase.     Blood sugar goals  - Fastin - 130 mg/dL  - 2 hours after meal: less than 180 mg/dL  - A1c: less than 7%     PharmD Follow-up: 24 0920 AM   PCP Follow-up: 25    Notify your healthcare provider if you have any of the following:  -Diarrhea or vomiting for more than six hours  -Blood sugar >300 mg/dL more than once  -Low blood sugar (<70 mg/dL)  -Questions about your medications    Thank you,  Kassie Hammonds, PharmD    Continue all meds under the continuation of care with the referring provider and clinical pharmacy team.  Verbal consent to manage patient's drug therapy was obtained. They were informed they may decline to participate or withdraw from participation in pharmacy services at any time.

## 2024-11-12 DIAGNOSIS — E11.22 TYPE 2 DIABETES MELLITUS WITH STAGE 3A CHRONIC KIDNEY DISEASE, WITHOUT LONG-TERM CURRENT USE OF INSULIN (MULTI): ICD-10-CM

## 2024-11-12 DIAGNOSIS — N18.31 TYPE 2 DIABETES MELLITUS WITH STAGE 3A CHRONIC KIDNEY DISEASE, WITHOUT LONG-TERM CURRENT USE OF INSULIN (MULTI): ICD-10-CM

## 2024-11-14 RX ORDER — INSULIN GLARGINE 100 [IU]/ML
32 INJECTION, SOLUTION SUBCUTANEOUS DAILY
Qty: 45 ML | Refills: 1 | Status: SHIPPED | OUTPATIENT
Start: 2024-11-14

## 2024-11-22 ENCOUNTER — APPOINTMENT (OUTPATIENT)
Dept: PHARMACY | Facility: HOSPITAL | Age: 59
End: 2024-11-22
Payer: COMMERCIAL

## 2024-11-22 DIAGNOSIS — I10 BENIGN ESSENTIAL HYPERTENSION: ICD-10-CM

## 2024-11-22 DIAGNOSIS — E11.22 TYPE 2 DIABETES MELLITUS WITH STAGE 3A CHRONIC KIDNEY DISEASE, WITHOUT LONG-TERM CURRENT USE OF INSULIN (MULTI): Primary | ICD-10-CM

## 2024-11-22 DIAGNOSIS — E11.9 TYPE 2 DIABETES MELLITUS WITHOUT COMPLICATION, WITH LONG-TERM CURRENT USE OF INSULIN (MULTI): ICD-10-CM

## 2024-11-22 DIAGNOSIS — N18.31 TYPE 2 DIABETES MELLITUS WITH STAGE 3A CHRONIC KIDNEY DISEASE, WITHOUT LONG-TERM CURRENT USE OF INSULIN (MULTI): Primary | ICD-10-CM

## 2024-11-22 DIAGNOSIS — Z79.4 TYPE 2 DIABETES MELLITUS WITHOUT COMPLICATION, WITH LONG-TERM CURRENT USE OF INSULIN (MULTI): ICD-10-CM

## 2024-11-22 PROCEDURE — RXMED WILLOW AMBULATORY MEDICATION CHARGE

## 2024-11-22 RX ORDER — DEXTROSE 4 G
TABLET,CHEWABLE ORAL
Qty: 1 EACH | Refills: 0 | Status: SHIPPED | OUTPATIENT
Start: 2024-11-22

## 2024-11-22 RX ORDER — BLOOD SUGAR DIAGNOSTIC
1 STRIP MISCELLANEOUS DAILY
Qty: 100 EACH | Refills: 3 | Status: SHIPPED | OUTPATIENT
Start: 2024-11-22

## 2024-11-22 RX ORDER — TIRZEPATIDE 10 MG/.5ML
10 INJECTION, SOLUTION SUBCUTANEOUS WEEKLY
Qty: 2 ML | Refills: 3 | Status: SHIPPED | OUTPATIENT
Start: 2024-11-22

## 2024-11-22 ASSESSMENT — ENCOUNTER SYMPTOMS: BLURRED VISION: 0

## 2024-11-22 NOTE — PROGRESS NOTES
"Pharmacist Clinic: Diabetes Management  Babak Forte is a 59 y.o. male who presents for a follow-up evaluation of his Type 2 diabetes mellitus.   Referring Provider: Cassy Dean*     Diabetes  He presents for his follow-up diabetic visit. He has type 2 diabetes mellitus. His disease course has been improving. Associated symptoms include foot paresthesias and polyuria. Pertinent negatives for diabetes include no blurred vision and no chest pain. There are no hypoglycemic complications. Diabetic complications include nephropathy and peripheral neuropathy. Risk factors for coronary artery disease include obesity, male sex, diabetes mellitus, dyslipidemia and hypertension. He is compliant with treatment all of the time. His weight is stable. His home blood glucose trend is decreasing steadily. An ACE inhibitor/angiotensin II receptor blocker is being taken.   Since last visit, Mounjaro increased to 10mg once weekly. Had one episode lasting 10 minutes of abdominal \"tightness\" No N/V or other GI side effects.  One incidence past month felt jittery, checked sugar it was 90 and felt better after drinking juice.    No missed dosing of Mounjaro. Occasional missed Lantus dosing on Saturdays. No change in appetite or weight, reported home weight 240 lbs.  Eats late 2am [employer] often due to work schedule, endorses night time cravings.    He reports continued reduction in alcohol use, limiting to 2 beers on weekends.     Works until 2-3 AM.  Discussed CGM - not interested, prefers fingerstick - has one glucometer for work and one for home.    No Known Allergies    LAB REVIEW   Glucose (mg/dL)   Date Value   08/26/2024 134 (H)   05/04/2024 138 (H)   02/01/2024 178 (H)     Hemoglobin A1C (%)   Date Value   08/26/2024 9.8 (H)   05/04/2024 10.1 (H)   02/01/2024 8.9 (H)     Urea Nitrogen (mg/dL)   Date Value   08/26/2024 19   05/04/2024 34 (H)   02/01/2024 20     Creatinine (mg/dL)   Date Value   08/26/2024 1.76 (H) " "  05/04/2024 1.99 (H)   02/01/2024 1.82 (H)     No results found for: \"ALBUR\", \"[id number]\"  Lab Results   Component Value Date    CHOL 141 02/01/2024    CHOL 124 09/16/2023    CHOL 157 01/09/2023     Lab Results   Component Value Date    HDL 27.7 02/01/2024    HDL 30.9 (A) 09/16/2023    HDL 31.1 (A) 01/09/2023     Lab Results   Component Value Date    LDLCALC 56 02/01/2024     Lab Results   Component Value Date    TRIG 285 (H) 02/01/2024    TRIG 305 (H) 01/09/2023    TRIG 79 11/09/2020     The 10-year ASCVD risk score (Susy RICHARDSON, et al., 2019) is: 24%    Values used to calculate the score:      Age: 59 years      Sex: Male      Is Non- : Yes      Diabetic: Yes      Tobacco smoker: No      Systolic Blood Pressure: 123 mmHg      Is BP treated: Yes      HDL Cholesterol: 27.7 mg/dL      Total Cholesterol: 141 mg/dL     DIABETES ASSESSMENT  CURRENT PHARMACOTHERAPY  - Farxiga 10mg once daily (AM)  - Lantus 30 units once daily (AM)  - Mounjaro 10mg weekly (Sunday)    SECONDARY PREVENTION  - Statin? Yes  - ACE-I/ARB? Yes  - Aspirin? Yes    HISTORICAL PHARMACOTHERAPY  - Glyburide (weight gain, renal)  - Trulicity (N/V)  - Metformin (renal function)    SMBG MEASUREMENTS  Patient is using: glucometer   The patient is currently checking the blood glucose a few times per week at work  Does not check AM FBG  Postprandial -145   Highest 160  No lows  < 70  No highs > 200  Patient does not report symptoms of hypoglycemia. Patient denies dizziness, jitteriness, and sweating.  Hypoglycemia awareness: Never had low incident, reviewed s/s  Patient does report symptoms of hyperglycemia. Patient reports excessive thirst and polyuria.    ADHERENCE/AFFORDABILITY  - No cost barriers  - Adherence barriers: schedule    Assessment/Plan   Problem List Items Addressed This Visit       Type 2 diabetes mellitus with stage 3a chronic kidney disease, without long-term current use of insulin (Multi) - Primary "       Patients diabetes is poorly controlled with most recent A1c of 9.8% (Goal < 7%). Improving per home readings.   Continue Lantus 30 units once daily, Mounjaro to 10mg once weekly, Farxiga 10mg once daily   Advised to test blood sugar once daily. Discussed alternating with checking in fasting state upon waking and 2 hours after eating. Record log for review at follow up.  Accu check no longer covered; updated scripts sent for preferred brand Contour   Active lab orders - will obtain next month and will follow up on results for further adjustments. Anticipate optimizing Mounjaro to de-escalate Lantus as able for metabolic risk reduction     2. Mounjaro Education:   Counseled patient on Mounjaro MOA, expectations, side effects, duration of therapy, administration, and monitoring parameters.  Provided detailed dosing and administration counseling to ensure proper technique.   Reviewed Mounjaro titration schedule, starting with 2.5 mg once weekly to a goal of 15 mg once weekly if tolerated  Counseled patient on the benefits of GLP-1ra glycemic control and weight loss  Reviewed storage requirements of Mounjaro when not in use, and when to administer the medication if a dose is missed.  Advised patient that they may experience improved satiety after meals and portion sizes of meals may be reduced as doses of Mounjaro increase.     Blood sugar goals  - Fastin - 130 mg/dL  - 2 hours after meal: less than 180 mg/dL  - A1c: less than 7%     PharmD Follow-up: 1/10/25 0920 AM (after lab work)  PCP Follow-up: 25    Notify your healthcare provider if you have any of the following:  -Diarrhea or vomiting for more than six hours  -Blood sugar >300 mg/dL more than once  -Low blood sugar (<70 mg/dL)  -Questions about your medications    Thank you,  Kassie Hammonds, PharmD    Continue all meds under the continuation of care with the referring provider and clinical pharmacy team.  Verbal consent to manage patient's drug  therapy was obtained. They were informed they may decline to participate or withdraw from participation in pharmacy services at any time.

## 2024-11-27 ENCOUNTER — PHARMACY VISIT (OUTPATIENT)
Dept: PHARMACY | Facility: CLINIC | Age: 59
End: 2024-11-27
Payer: COMMERCIAL

## 2024-11-27 ENCOUNTER — PHARMACY VISIT (OUTPATIENT)
Dept: PHARMACY | Facility: CLINIC | Age: 59
End: 2024-11-27

## 2024-12-11 ENCOUNTER — APPOINTMENT (OUTPATIENT)
Dept: PRIMARY CARE | Facility: CLINIC | Age: 59
End: 2024-12-11
Payer: COMMERCIAL

## 2025-01-07 ENCOUNTER — LAB (OUTPATIENT)
Dept: LAB | Facility: LAB | Age: 60
End: 2025-01-07
Payer: COMMERCIAL

## 2025-01-07 ENCOUNTER — APPOINTMENT (OUTPATIENT)
Dept: RADIOLOGY | Facility: HOSPITAL | Age: 60
End: 2025-01-07
Payer: COMMERCIAL

## 2025-01-07 ENCOUNTER — HOSPITAL ENCOUNTER (EMERGENCY)
Facility: HOSPITAL | Age: 60
Discharge: HOME | End: 2025-01-07
Payer: COMMERCIAL

## 2025-01-07 VITALS
WEIGHT: 245 LBS | DIASTOLIC BLOOD PRESSURE: 88 MMHG | OXYGEN SATURATION: 97 % | BODY MASS INDEX: 38.45 KG/M2 | SYSTOLIC BLOOD PRESSURE: 165 MMHG | RESPIRATION RATE: 16 BRPM | HEART RATE: 82 BPM | TEMPERATURE: 97.7 F | HEIGHT: 67 IN

## 2025-01-07 DIAGNOSIS — M79.642 HAND PAIN, LEFT: ICD-10-CM

## 2025-01-07 DIAGNOSIS — E11.9 TYPE 2 DIABETES MELLITUS WITHOUT COMPLICATION, WITH LONG-TERM CURRENT USE OF INSULIN (MULTI): ICD-10-CM

## 2025-01-07 DIAGNOSIS — M19.90 ARTHRITIS: ICD-10-CM

## 2025-01-07 DIAGNOSIS — Z00.00 HEALTH CARE MAINTENANCE: ICD-10-CM

## 2025-01-07 DIAGNOSIS — Z79.4 TYPE 2 DIABETES MELLITUS WITHOUT COMPLICATION, WITH LONG-TERM CURRENT USE OF INSULIN (MULTI): ICD-10-CM

## 2025-01-07 DIAGNOSIS — M25.532 WRIST PAIN, ACUTE, LEFT: Primary | ICD-10-CM

## 2025-01-07 LAB
ALBUMIN SERPL BCP-MCNC: 4.7 G/DL (ref 3.4–5)
ALP SERPL-CCNC: 73 U/L (ref 33–136)
ALT SERPL W P-5'-P-CCNC: 40 U/L (ref 10–52)
ANION GAP SERPL CALC-SCNC: 16 MMOL/L (ref 10–20)
AST SERPL W P-5'-P-CCNC: 32 U/L (ref 9–39)
BILIRUB SERPL-MCNC: 0.8 MG/DL (ref 0–1.2)
BUN SERPL-MCNC: 21 MG/DL (ref 6–23)
CALCIUM SERPL-MCNC: 10 MG/DL (ref 8.6–10.6)
CHLORIDE SERPL-SCNC: 102 MMOL/L (ref 98–107)
CHOLEST SERPL-MCNC: 150 MG/DL (ref 0–199)
CHOLESTEROL/HDL RATIO: 4.2
CO2 SERPL-SCNC: 26 MMOL/L (ref 21–32)
CREAT SERPL-MCNC: 1.74 MG/DL (ref 0.5–1.3)
CREAT UR-MCNC: 174.7 MG/DL (ref 20–370)
EGFRCR SERPLBLD CKD-EPI 2021: 44 ML/MIN/1.73M*2
ERYTHROCYTE [DISTWIDTH] IN BLOOD BY AUTOMATED COUNT: 13.2 % (ref 11.5–14.5)
EST. AVERAGE GLUCOSE BLD GHB EST-MCNC: 163 MG/DL
GLUCOSE SERPL-MCNC: 151 MG/DL (ref 74–99)
HBA1C MFR BLD: 7.3 %
HCT VFR BLD AUTO: 45.7 % (ref 41–52)
HDLC SERPL-MCNC: 36 MG/DL
HGB BLD-MCNC: 14.9 G/DL (ref 13.5–17.5)
LDLC SERPL CALC-MCNC: 62 MG/DL
MCH RBC QN AUTO: 28.9 PG (ref 26–34)
MCHC RBC AUTO-ENTMCNC: 32.6 G/DL (ref 32–36)
MCV RBC AUTO: 89 FL (ref 80–100)
MICROALBUMIN UR-MCNC: 45.8 MG/L
MICROALBUMIN/CREAT UR: 26.2 UG/MG CREAT
NON HDL CHOLESTEROL: 114 MG/DL (ref 0–149)
NRBC BLD-RTO: 0 /100 WBCS (ref 0–0)
PLATELET # BLD AUTO: 263 X10*3/UL (ref 150–450)
POTASSIUM SERPL-SCNC: 4.5 MMOL/L (ref 3.5–5.3)
PROT SERPL-MCNC: 8 G/DL (ref 6.4–8.2)
PSA SERPL-MCNC: 2.72 NG/ML
RBC # BLD AUTO: 5.16 X10*6/UL (ref 4.5–5.9)
SODIUM SERPL-SCNC: 139 MMOL/L (ref 136–145)
TRIGL SERPL-MCNC: 260 MG/DL (ref 0–149)
TSH SERPL-ACNC: 2.79 MIU/L (ref 0.44–3.98)
VLDL: 52 MG/DL (ref 0–40)
WBC # BLD AUTO: 7.3 X10*3/UL (ref 4.4–11.3)

## 2025-01-07 PROCEDURE — 73110 X-RAY EXAM OF WRIST: CPT | Mod: LEFT SIDE | Performed by: STUDENT IN AN ORGANIZED HEALTH CARE EDUCATION/TRAINING PROGRAM

## 2025-01-07 PROCEDURE — 84443 ASSAY THYROID STIM HORMONE: CPT

## 2025-01-07 PROCEDURE — 83036 HEMOGLOBIN GLYCOSYLATED A1C: CPT

## 2025-01-07 PROCEDURE — 73110 X-RAY EXAM OF WRIST: CPT | Mod: LT

## 2025-01-07 PROCEDURE — 99283 EMERGENCY DEPT VISIT LOW MDM: CPT

## 2025-01-07 PROCEDURE — 85027 COMPLETE CBC AUTOMATED: CPT

## 2025-01-07 PROCEDURE — 82043 UR ALBUMIN QUANTITATIVE: CPT

## 2025-01-07 PROCEDURE — 84153 ASSAY OF PSA TOTAL: CPT

## 2025-01-07 PROCEDURE — 80061 LIPID PANEL: CPT

## 2025-01-07 PROCEDURE — 80053 COMPREHEN METABOLIC PANEL: CPT

## 2025-01-07 PROCEDURE — 82570 ASSAY OF URINE CREATININE: CPT

## 2025-01-07 RX ORDER — PREDNISONE 20 MG/1
40 TABLET ORAL ONCE
Status: DISCONTINUED | OUTPATIENT
Start: 2025-01-07 | End: 2025-01-07

## 2025-01-07 RX ORDER — DICLOFENAC SODIUM 10 MG/G
4 GEL TOPICAL 4 TIMES DAILY
Qty: 50 G | Refills: 0 | Status: SHIPPED | OUTPATIENT
Start: 2025-01-07

## 2025-01-07 RX ORDER — TRAMADOL HYDROCHLORIDE 50 MG/1
50 TABLET ORAL EVERY 6 HOURS PRN
Qty: 12 TABLET | Refills: 0 | Status: SHIPPED | OUTPATIENT
Start: 2025-01-07 | End: 2025-01-10

## 2025-01-07 ASSESSMENT — PAIN SCALES - GENERAL: PAINLEVEL_OUTOF10: 6

## 2025-01-07 ASSESSMENT — COLUMBIA-SUICIDE SEVERITY RATING SCALE - C-SSRS
2. HAVE YOU ACTUALLY HAD ANY THOUGHTS OF KILLING YOURSELF?: NO
6. HAVE YOU EVER DONE ANYTHING, STARTED TO DO ANYTHING, OR PREPARED TO DO ANYTHING TO END YOUR LIFE?: NO
1. IN THE PAST MONTH, HAVE YOU WISHED YOU WERE DEAD OR WISHED YOU COULD GO TO SLEEP AND NOT WAKE UP?: NO

## 2025-01-07 ASSESSMENT — PAIN - FUNCTIONAL ASSESSMENT: PAIN_FUNCTIONAL_ASSESSMENT: 0-10

## 2025-01-07 NOTE — ED PROVIDER NOTES
HPI   Chief Complaint   Patient presents with    Hand Pain    Hand swelling       Is a 59-year-old male who complains of pain in the left wrist and hand no injury pain is worse with movement better in a splint that he has around the house.  No history of gout no other injury.  States he was lifting heavy salt bags but does not have any specific injury.  Remotely had carpal tunnel but does not feel like carpal tunnel as his pain is more on the dorsum and no radiation to his fingers.              Patient History   Past Medical History:   Diagnosis Date    Personal history of other diseases of the circulatory system     History of diastolic dysfunction     Past Surgical History:   Procedure Laterality Date    ANKLE SURGERY  08/23/2013    Ankle Surgery    COLONOSCOPY  01/04/2016    Complete Colonoscopy     Family History   Problem Relation Name Age of Onset    Diabetes Mother      Stroke Father      Hypertension Father      Hypertension Other Family History      Social History     Tobacco Use    Smoking status: Never    Smokeless tobacco: Never   Substance Use Topics    Alcohol use: Not Currently     Alcohol/week: 1.0 standard drink of alcohol     Types: 1 Shots of liquor per week    Drug use: Never       Physical Exam   ED Triage Vitals [01/07/25 0854]   Temperature Heart Rate Respirations BP   36.5 °C (97.7 °F) 82 16 165/88      Pulse Ox Temp src Heart Rate Source Patient Position   97 % -- -- --      BP Location FiO2 (%)     -- --       Physical Exam  Vitals reviewed.   Constitutional:       General: He is not in acute distress.     Appearance: Normal appearance. He is normal weight. He is not ill-appearing, toxic-appearing or diaphoretic.   HENT:      Head: Normocephalic and atraumatic.      Right Ear: External ear normal.      Left Ear: External ear normal.      Nose: Nose normal.   Eyes:      Extraocular Movements: Extraocular movements intact.      Conjunctiva/sclera: Conjunctivae normal.      Pupils: Pupils are  equal, round, and reactive to light.   Pulmonary:      Effort: Pulmonary effort is normal. No respiratory distress.      Breath sounds: No stridor.   Abdominal:      General: There is no distension.   Musculoskeletal:         General: Swelling and tenderness present. No deformity.      Cervical back: Normal range of motion.      Comments: Swelling and tenderness around the left wrist.  No redness.  Pain with movement no Tinel's or Phalen's   neurovascularly intact.   Skin:     Capillary Refill: Capillary refill takes less than 2 seconds.      Findings: No rash.   Neurological:      General: No focal deficit present.      Mental Status: He is alert and oriented to person, place, and time. Mental status is at baseline.   Psychiatric:         Mood and Affect: Mood normal.         Behavior: Behavior normal.         Thought Content: Thought content normal.         Judgment: Judgment normal.           ED Course & MDM   Diagnoses as of 01/07/25 1818   Wrist pain, acute, left   Hand pain, left   Arthritis                 No data recorded     Olivier Coma Scale Score: 15 (01/07/25 0956 : Angela Fitch RN)                           Medical Decision Making  Differential diagnosis of gout, arthritis, trauma however history and physical does not support  No suspicion of infectious process    X-ray shows arthritis per radiology    Placed in splint by me neurovascular intact alignment of Velcro splint    Considering he is diabetic I will give him Voltaren gel and tramadol  [facility] report reviewed  Referred to hand        Procedure  Procedures     Jarret Ocasio PA-C  01/07/25 1824

## 2025-01-08 ENCOUNTER — TELEMEDICINE (OUTPATIENT)
Dept: PHARMACY | Facility: HOSPITAL | Age: 60
End: 2025-01-08
Payer: COMMERCIAL

## 2025-01-08 DIAGNOSIS — M79.642 LEFT HAND PAIN: Primary | ICD-10-CM

## 2025-01-08 DIAGNOSIS — N18.31 TYPE 2 DIABETES MELLITUS WITH STAGE 3A CHRONIC KIDNEY DISEASE, WITHOUT LONG-TERM CURRENT USE OF INSULIN (MULTI): ICD-10-CM

## 2025-01-08 DIAGNOSIS — E11.22 TYPE 2 DIABETES MELLITUS WITH STAGE 3A CHRONIC KIDNEY DISEASE, WITHOUT LONG-TERM CURRENT USE OF INSULIN (MULTI): ICD-10-CM

## 2025-01-08 RX ORDER — TIRZEPATIDE 12.5 MG/.5ML
12.5 INJECTION, SOLUTION SUBCUTANEOUS WEEKLY
Qty: 2 ML | Refills: 1 | Status: SHIPPED | OUTPATIENT
Start: 2025-01-08

## 2025-01-08 ASSESSMENT — ENCOUNTER SYMPTOMS: BLURRED VISION: 0

## 2025-01-08 NOTE — PROGRESS NOTES
"Pharmacist Clinic: Diabetes Management  Babak Forte is a 59 y.o. male who presents for a follow-up evaluation of his Type 2 diabetes mellitus.   Referring Provider: Cassy Dean*     Diabetes  He presents for his follow-up diabetic visit. He has type 2 diabetes mellitus. His disease course has been improving. Associated symptoms include foot paresthesias and polyuria. Pertinent negatives for diabetes include no blurred vision and no chest pain. There are no hypoglycemic complications. Diabetic complications include nephropathy and peripheral neuropathy. Risk factors for coronary artery disease include obesity, male sex, diabetes mellitus, dyslipidemia and hypertension. He is compliant with treatment all of the time. His weight is stable. His home blood glucose trend is decreasing steadily. An ACE inhibitor/angiotensin II receptor blocker is being taken.   Since last visit, Mounjaro  maintained at 10mg once weekly. Tolerating well denies GI side effects.  Repeat A1c reduced 9.8% to 7.3% !    C/o wrist pain, asking for gout workup. Seen in ED yesterday given tramadol and voltaren without relief     Alcohol use: PMH binge drinking; now limiting to 2 beers on weekends    Works until 2-3 AM    No Known Allergies    LAB REVIEW   Glucose (mg/dL)   Date Value   01/07/2025 151 (H)   08/26/2024 134 (H)   05/04/2024 138 (H)     Hemoglobin A1C (%)   Date Value   01/07/2025 7.3 (H)   08/26/2024 9.8 (H)   05/04/2024 10.1 (H)     Urea Nitrogen (mg/dL)   Date Value   01/07/2025 21   08/26/2024 19   05/04/2024 34 (H)     Creatinine (mg/dL)   Date Value   01/07/2025 1.74 (H)   08/26/2024 1.76 (H)   05/04/2024 1.99 (H)     No results found for: \"ALBUR\", \"[id number]\"  Lab Results   Component Value Date    CHOL 150 01/07/2025    CHOL 141 02/01/2024    CHOL 124 09/16/2023     Lab Results   Component Value Date    HDL 36.0 01/07/2025    HDL 27.7 02/01/2024    HDL 30.9 (A) 09/16/2023     Lab Results   Component Value Date    " LDLCALC 62 2025    LDLCALC 56 2024     Lab Results   Component Value Date    TRIG 260 (H) 2025    TRIG 285 (H) 2024    TRIG 305 (H) 2023     The 10-year ASCVD risk score (Susy RICHARDSON, et al., 2019) is: 36.4%    Values used to calculate the score:      Age: 59 years      Sex: Male      Is Non- : Yes      Diabetic: Yes      Tobacco smoker: No      Systolic Blood Pressure: 165 mmHg      Is BP treated: Yes      HDL Cholesterol: 36 mg/dL      Total Cholesterol: 150 mg/dL     DIABETES ASSESSMENT  CURRENT PHARMACOTHERAPY  - Farxiga 10mg once daily (AM)  - Lantus 30 units once daily (AM)  - Mounjaro 10mg weekly ()    SECONDARY PREVENTION  - Statin? Yes  - ACE-I/ARB? Yes  - Aspirin? Yes    HISTORICAL PHARMACOTHERAPY  - Glyburide (weight gain, renal)  - Trulicity (N/V)  - Metformin (renal function)    SMBG MEASUREMENTS  Patient is using: Contour glucometer (declines CGM)  The patient is currently checking the blood glucose a few times per week at work  Does not check AM FBG  Postprandial 113, 117, 98 (reports none > 125)  No lows  < 70    ADHERENCE/AFFORDABILITY  - No cost barriers  - Adherence barriers: schedule    Assessment/Plan   Problem List Items Addressed This Visit       Type 2 diabetes mellitus with stage 3a chronic kidney disease, without long-term current use of insulin (Multi)       Patients diabetes is improved with most recent A1c of 7.3% (Goal < 7%)  After utilization of current Mounjaro supply:  Increase Mounjaro to 12.5mg once weekly  Reduce Lantus to 20 units once daily   Continue Farxiga 10mg once daily   Counseled on medication MOA, administration, side effects, monitoring.   Advised to test blood sugar once daily. Discussed alternating with checking in fasting state upon waking and 2 hours after eating. Record log for review at follow up.  Per pt request sent request for uric acid order to PCP    Blood sugar goals  - Fastin - 130 mg/dL  - 2  hours after meal: less than 180 mg/dL  - A1c: less than 7%     PharmD Follow-up: 2/14/25  PCP Follow-up: 1/27/25    Notify your healthcare provider if you have any of the following:  -Diarrhea or vomiting for more than six hours  -Blood sugar >300 mg/dL more than once  -Low blood sugar (<70 mg/dL)  -Questions about your medications    Thank you,  Kassie Hammonds, PharmD    Continue all meds under the continuation of care with the referring provider and clinical pharmacy team.  Verbal consent to manage patient's drug therapy was obtained. They were informed they may decline to participate or withdraw from participation in pharmacy services at any time.

## 2025-01-10 ENCOUNTER — OFFICE VISIT (OUTPATIENT)
Dept: ORTHOPEDIC SURGERY | Facility: CLINIC | Age: 60
End: 2025-01-10
Payer: COMMERCIAL

## 2025-01-10 ENCOUNTER — APPOINTMENT (OUTPATIENT)
Dept: PHARMACY | Facility: HOSPITAL | Age: 60
End: 2025-01-10
Payer: COMMERCIAL

## 2025-01-10 VITALS — WEIGHT: 245 LBS | BODY MASS INDEX: 38.45 KG/M2 | HEIGHT: 67 IN

## 2025-01-10 DIAGNOSIS — M25.532 WRIST PAIN, ACUTE, LEFT: Primary | ICD-10-CM

## 2025-01-10 PROCEDURE — 99214 OFFICE O/P EST MOD 30 MIN: CPT | Performed by: STUDENT IN AN ORGANIZED HEALTH CARE EDUCATION/TRAINING PROGRAM

## 2025-01-10 PROCEDURE — 3051F HG A1C>EQUAL 7.0%<8.0%: CPT | Performed by: STUDENT IN AN ORGANIZED HEALTH CARE EDUCATION/TRAINING PROGRAM

## 2025-01-10 PROCEDURE — 4010F ACE/ARB THERAPY RXD/TAKEN: CPT | Performed by: STUDENT IN AN ORGANIZED HEALTH CARE EDUCATION/TRAINING PROGRAM

## 2025-01-10 PROCEDURE — 99204 OFFICE O/P NEW MOD 45 MIN: CPT | Performed by: STUDENT IN AN ORGANIZED HEALTH CARE EDUCATION/TRAINING PROGRAM

## 2025-01-10 PROCEDURE — 1036F TOBACCO NON-USER: CPT | Performed by: STUDENT IN AN ORGANIZED HEALTH CARE EDUCATION/TRAINING PROGRAM

## 2025-01-10 PROCEDURE — 3008F BODY MASS INDEX DOCD: CPT | Performed by: STUDENT IN AN ORGANIZED HEALTH CARE EDUCATION/TRAINING PROGRAM

## 2025-01-10 PROCEDURE — 3060F POS MICROALBUMINURIA REV: CPT | Performed by: STUDENT IN AN ORGANIZED HEALTH CARE EDUCATION/TRAINING PROGRAM

## 2025-01-10 PROCEDURE — 3048F LDL-C <100 MG/DL: CPT | Performed by: STUDENT IN AN ORGANIZED HEALTH CARE EDUCATION/TRAINING PROGRAM

## 2025-01-10 ASSESSMENT — PAIN DESCRIPTION - DESCRIPTORS: DESCRIPTORS: SORE

## 2025-01-10 ASSESSMENT — PAIN SCALES - GENERAL: PAINLEVEL_OUTOF10: 5 - MODERATE PAIN

## 2025-01-10 ASSESSMENT — PAIN - FUNCTIONAL ASSESSMENT: PAIN_FUNCTIONAL_ASSESSMENT: 0-10

## 2025-01-10 NOTE — PROGRESS NOTES
CHIEF COMPLAINT: recurrent acute left wrist pain  DOI:none  DOS:none      HISTORY OF PRESENT ILLNESS    This is a very pleasant 59-year-old male, right-hand-dominant, works at [employer] as a manager, lives with his wife, denies active tobacco use positive diabetes, no anticoagulation, no discrete injuries or traumas to his left wrist.  No prior surgeries to his left wrist.  He is presenting for ED follow-up after second bout of acute atraumatic severe left wrist pain.  He explains that this is the second time this happened in 1 year.  He does not recall any inciting event other than perhaps drinking alcohol he woke up with severe left wrist pain which is exacerbated by any attempted movement.  There is no associated numbness tingling or paresthesias.  With immobilization in a removable splint his pain is quickly resolving.  It is tolerable today however still painful with attempted wrist flexion.    PHYSICAL EXAM    Extremities / Musculoskeletal:                  Left upper extremity:  No gross deformity no active skin lesions open wounds no erythema edema or ecchymoses.  Tender palpation about the radiocarpal joint.  Nontender palpation about the radial styloid Polly's tubercle dorsal DRUJ dorsal SL interval anatomic snuffbox scaphoid tubercle.  Wrist extension is about 45 degrees wrist flexion painful about 45 degrees.  Near full pronosupination.  Full composite digital flexion full resisted MCP extension.      IMAGING / LABS / EMGs:    Left wrist x-ray series obtained on 1/7/2025 independently reviewed demonstrating mild joint space narrowing of the radiocarpal joint.  Likely subtle intra-articular calcifications.    Past Medical History:   Diagnosis Date    Personal history of other diseases of the circulatory system     History of diastolic dysfunction       Medication Documentation Review Audit       Reviewed by Alejandro Teran LPN (Licensed Nurse) on 01/10/25 at 1148      Medication Order Taking? Sig  "Documenting Provider Last Dose Status   acetaminophen (Tylenol) 500 mg tablet [id number] No Take 1-2 tablets (500-1,000 mg) by mouth if needed (Every 4 to 6 hours; for pain no more than 6 tablets in 24 hours). Historical Provider, MD Taking Active   alcohol swabs (Alcohol Pads) pads, medicated [id number] No Use as directed Ashley Turcios MD Taking Active   aspirin 81 mg EC tablet [id number] No 1 tablet 2 TIMES DAILY (route: oral) Historical Provider, MD Taking Active   atorvastatin (Lipitor) 40 mg tablet [id number] No Take 1 tablet (40 mg) by mouth once daily. Ashley Turcios MD Taking Active   blood sugar diagnostic (Contour Next Test Strips) strip [id number]  1 each once daily. Cassy Dean MD  Active   blood-glucose meter (Accu-Chek Guide Glucose Meter) misc [id number]  Test blood sugar once daily Cassy Dean MD  Active   dapagliflozin propanediol (Farxiga) 10 mg [id number]  Take 1 tablet (10 mg) by mouth once daily in the morning. Cassy Dean MD  Active   diclofenac sodium (Voltaren) 1 % gel [id number]  Apply 4.5 inches (4 g) topically 4 times a day. Jarret Ocasio PA-C  Active   hydroCHLOROthiazide (HYDRODiuril) 25 mg tablet [id number] No Take 1 tablet (25 mg) by mouth once daily. Ashley Turcios MD Taking Active   insulin glargine (Lantus Solostar U-100 Insulin) 100 unit/mL (3 mL) pen [id number]  INJECT 32 UNITS UNDER THE SKIN ONCE DAILY. Cassy Dean MD  Active   lancets misc [id number] No Use as directed twice per day Ashley Turcios MD Taking Active   lisinopril 40 mg tablet [id number]  Take 1 tablet (40 mg) by mouth once daily. as directed Cassy Dean MD  Active   pen needle, diabetic 32 gauge x 1/4\" needle [id number] No Use for Lantus injection once daily. Discard after use. Do not re-use pen needles. Ashley Turcios MD Taking Active     Discontinued 01/08/25 1302   tirzepatide (Mounjaro) 12.5 mg/0.5 mL pen injector [id number]  Inject 12.5 mg under " the skin 1 (one) time per week. Cassy Dean MD  Active   traMADol (Ultram) 50 mg tablet [id number]  Take 1 tablet (50 mg) by mouth every 6 hours if needed for severe pain (7 - 10) for up to 3 days. 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  Given his diabetes and his renal function I would avoid any high-dose NSAIDs.  The fact that he is trending towards complete resolution we will continue with treatment in the form of removable wrist brace.  If he does have another flare would like to see him during his peak symptoms.  We discussed that it may be worthwhile to see rheumatology for possible medical management or at least confirmation of the diagnosis.    PLAN:   Continue removable wrist brace for another week  Return to clinic if having another flare    Follow-up in: Although we do not need a scheduled follow-up encouraged patient return to clinic if he has any issues whatsoever  XR at next visit: none      Bebeto Rey M.D.    Department of Orthopaedics  Hand/Upper Extremity  Phone: [phone]  Appt. Phone: [phone]

## 2025-01-27 ENCOUNTER — APPOINTMENT (OUTPATIENT)
Dept: PRIMARY CARE | Facility: CLINIC | Age: 60
End: 2025-01-27
Payer: COMMERCIAL

## 2025-01-27 VITALS
OXYGEN SATURATION: 93 % | DIASTOLIC BLOOD PRESSURE: 80 MMHG | HEART RATE: 83 BPM | HEIGHT: 67 IN | BODY MASS INDEX: 38.3 KG/M2 | SYSTOLIC BLOOD PRESSURE: 135 MMHG | WEIGHT: 244 LBS

## 2025-01-27 DIAGNOSIS — M25.532 ARTHRALGIA OF LEFT WRIST: ICD-10-CM

## 2025-01-27 DIAGNOSIS — Z79.4 TYPE 2 DIABETES MELLITUS WITHOUT COMPLICATION, WITH LONG-TERM CURRENT USE OF INSULIN (MULTI): ICD-10-CM

## 2025-01-27 DIAGNOSIS — B35.1 ONYCHOMYCOSIS: ICD-10-CM

## 2025-01-27 DIAGNOSIS — Z00.00 HEALTH CARE MAINTENANCE: Primary | ICD-10-CM

## 2025-01-27 DIAGNOSIS — E11.9 TYPE 2 DIABETES MELLITUS WITHOUT COMPLICATION, WITH LONG-TERM CURRENT USE OF INSULIN (MULTI): ICD-10-CM

## 2025-01-27 PROCEDURE — 3060F POS MICROALBUMINURIA REV: CPT | Performed by: STUDENT IN AN ORGANIZED HEALTH CARE EDUCATION/TRAINING PROGRAM

## 2025-01-27 PROCEDURE — 3075F SYST BP GE 130 - 139MM HG: CPT | Performed by: STUDENT IN AN ORGANIZED HEALTH CARE EDUCATION/TRAINING PROGRAM

## 2025-01-27 PROCEDURE — 3048F LDL-C <100 MG/DL: CPT | Performed by: STUDENT IN AN ORGANIZED HEALTH CARE EDUCATION/TRAINING PROGRAM

## 2025-01-27 PROCEDURE — 3051F HG A1C>EQUAL 7.0%<8.0%: CPT | Performed by: STUDENT IN AN ORGANIZED HEALTH CARE EDUCATION/TRAINING PROGRAM

## 2025-01-27 PROCEDURE — 90471 IMMUNIZATION ADMIN: CPT | Performed by: STUDENT IN AN ORGANIZED HEALTH CARE EDUCATION/TRAINING PROGRAM

## 2025-01-27 PROCEDURE — 90677 PCV20 VACCINE IM: CPT | Performed by: STUDENT IN AN ORGANIZED HEALTH CARE EDUCATION/TRAINING PROGRAM

## 2025-01-27 PROCEDURE — 4010F ACE/ARB THERAPY RXD/TAKEN: CPT | Performed by: STUDENT IN AN ORGANIZED HEALTH CARE EDUCATION/TRAINING PROGRAM

## 2025-01-27 PROCEDURE — 3079F DIAST BP 80-89 MM HG: CPT | Performed by: STUDENT IN AN ORGANIZED HEALTH CARE EDUCATION/TRAINING PROGRAM

## 2025-01-27 PROCEDURE — 99396 PREV VISIT EST AGE 40-64: CPT | Performed by: STUDENT IN AN ORGANIZED HEALTH CARE EDUCATION/TRAINING PROGRAM

## 2025-01-27 PROCEDURE — 3008F BODY MASS INDEX DOCD: CPT | Performed by: STUDENT IN AN ORGANIZED HEALTH CARE EDUCATION/TRAINING PROGRAM

## 2025-01-27 RX ORDER — CICLOPIROX 80 MG/ML
SOLUTION TOPICAL
Qty: 6.6 ML | Refills: 2 | Status: SHIPPED | OUTPATIENT
Start: 2025-01-27

## 2025-02-14 ENCOUNTER — APPOINTMENT (OUTPATIENT)
Dept: PHARMACY | Facility: HOSPITAL | Age: 60
End: 2025-02-14
Payer: COMMERCIAL

## 2025-02-21 ENCOUNTER — APPOINTMENT (OUTPATIENT)
Dept: PHARMACY | Facility: HOSPITAL | Age: 60
End: 2025-02-21
Payer: COMMERCIAL

## 2025-02-21 DIAGNOSIS — N18.31 TYPE 2 DIABETES MELLITUS WITH STAGE 3A CHRONIC KIDNEY DISEASE, WITHOUT LONG-TERM CURRENT USE OF INSULIN (MULTI): ICD-10-CM

## 2025-02-21 DIAGNOSIS — E11.22 TYPE 2 DIABETES MELLITUS WITH STAGE 3A CHRONIC KIDNEY DISEASE, WITHOUT LONG-TERM CURRENT USE OF INSULIN (MULTI): ICD-10-CM

## 2025-02-21 RX ORDER — TIRZEPATIDE 12.5 MG/.5ML
12.5 INJECTION, SOLUTION SUBCUTANEOUS WEEKLY
Qty: 2 ML | Refills: 1 | Status: SHIPPED | OUTPATIENT
Start: 2025-02-21

## 2025-02-21 RX ORDER — INSULIN GLARGINE 100 [IU]/ML
20 INJECTION, SOLUTION SUBCUTANEOUS DAILY
Qty: 15 ML | Refills: 1 | Status: SHIPPED | OUTPATIENT
Start: 2025-02-21

## 2025-02-21 NOTE — PROGRESS NOTES
"Pharmacist Clinic: Diabetes Management  Babak Forte is a 59 y.o. male who presents for a follow-up evaluation of his Type 2 diabetes mellitus.   Referring Provider: Cassy Dean*     Diabetes  He presents for his follow-up diabetic visit. He has type 2 diabetes mellitus. His disease course has been improving. Associated symptoms include foot paresthesias and polyuria. There are no hypoglycemic complications. Diabetic complications include nephropathy and peripheral neuropathy. Risk factors for coronary artery disease include obesity, male sex, diabetes mellitus, dyslipidemia and hypertension. He is compliant with treatment all of the time. His weight is stable. His home blood glucose trend is decreasing steadily. An ACE inhibitor/angiotensin II receptor blocker is being taken.   Since last visit, Mounjaro increased to 12.5mg once weekly and Lantus reduced to 20 units daily.  Tolerating well denies GI side effects. No significant change in appetite or weight. Infrequent home checking however few  blood sugar readings are stable.    Alcohol use: PMH binge drinking; now limiting to 2 beers on weekends    Works until 2-3 AM    No Known Allergies    LAB REVIEW   Glucose (mg/dL)   Date Value   01/07/2025 151 (H)   08/26/2024 134 (H)   05/04/2024 138 (H)     Hemoglobin A1C (%)   Date Value   01/07/2025 7.3 (H)   08/26/2024 9.8 (H)   05/04/2024 10.1 (H)     Urea Nitrogen (mg/dL)   Date Value   01/07/2025 21   08/26/2024 19   05/04/2024 34 (H)     Creatinine (mg/dL)   Date Value   01/07/2025 1.74 (H)   08/26/2024 1.76 (H)   05/04/2024 1.99 (H)     No results found for: \"ALBUR\", \"[id number]\"  Lab Results   Component Value Date    CHOL 150 01/07/2025    CHOL 141 02/01/2024    CHOL 124 09/16/2023     Lab Results   Component Value Date    HDL 36.0 01/07/2025    HDL 27.7 02/01/2024    HDL 30.9 (A) 09/16/2023     Lab Results   Component Value Date    LDLCALC 62 01/07/2025    LDLCALC 56 02/01/2024     Lab Results "   Component Value Date    TRIG 260 (H) 2025    TRIG 285 (H) 2024    TRIG 305 (H) 2023     The 10-year ASCVD risk score (Susy RICHARDSON, et al., 2019) is: 26.5%    Values used to calculate the score:      Age: 59 years      Sex: Male      Is Non- : Yes      Diabetic: Yes      Tobacco smoker: No      Systolic Blood Pressure: 135 mmHg      Is BP treated: Yes      HDL Cholesterol: 36 mg/dL      Total Cholesterol: 150 mg/dL     DIABETES ASSESSMENT  CURRENT PHARMACOTHERAPY  - Farxiga 10mg once daily (AM)  - Lantus 20 units once daily (AM)  - Mounjaro 12.5mg weekly ()    SECONDARY PREVENTION  - Statin? Yes  - ACE-I/ARB? Yes  - Aspirin? Yes    HISTORICAL PHARMACOTHERAPY  - Glyburide (weight gain, renal)  - Trulicity (N/V)  - Metformin (renal function)    SMBG MEASUREMENTS  Patient is using: Contour glucometer (declines CGM)  The patient is currently checking the blood glucose a few times per week at work  Does not check AM FBG  Postprandial 132,138,168  No lows  < 70    ADHERENCE/AFFORDABILITY  - No cost barriers  - Adherence barriers: schedule    Assessment/Plan   Problem List Items Addressed This Visit       Type 2 diabetes mellitus with stage 3a chronic kidney disease, without long-term current use of insulin (Multi)     Patients diabetes is improved with most recent A1c of 7.3% (Goal < 7%)  Continue Mounjaro 12.5mg once weekly, Lantus 20 units once daily, Farxiga 10mg once daily   Counseled on medication MOA, administration, side effects, monitoring.   Advised to test blood sugar once daily. Discussed alternating with checking in fasting state upon waking and 2 hours after eating. Record log for review at follow up.  Goal is to optimize GLP1a to reduce insulin requirements to provide consolidated regimen and metabolic benefit    Blood sugar goals  - Fastin - 130 mg/dL  - 2 hours after meal: less than 180 mg/dL  - A1c: less than 7%     PharmD Follow-up: 25   PCP  Follow-up: 1/20/26    Thank you,  Kassie Hammonds, PharmD    Continue all meds under the continuation of care with the referring provider and clinical pharmacy team.  Verbal consent to manage patient's drug therapy was obtained. They were informed they may decline to participate or withdraw from participation in pharmacy services at any time.

## 2025-03-03 ASSESSMENT — ENCOUNTER SYMPTOMS
NAUSEA: 0
VOMITING: 0
FEVER: 0
WEAKNESS: 0
WHEEZING: 0
CONSTIPATION: 0
HEMATURIA: 0
DIZZINESS: 0
COUGH: 0
SHORTNESS OF BREATH: 0
DYSURIA: 0
ACTIVITY CHANGE: 0
ABDOMINAL PAIN: 0
NUMBNESS: 0
SPEECH DIFFICULTY: 0

## 2025-03-04 NOTE — PROGRESS NOTES
"Subjective   Patient ID: Babak Forte is a 59 y.o. male who presents for Annual Exam.  HPI  Patient is here for annual physical exam      Chronic active medical problems   hyperlipidemia  Hypertension- hydrochlorothiazide 25; lisinopril 40  Diabetes on Farxiga 10 mg, metformin extended release thousand twice daily Lantus 28  Healthcare maintenance  Colonoscopy 2021  S/p  Past Medical History:   Diagnosis Date    Personal history of other diseases of the circulatory system     History of diastolic dysfunction      Past Surgical History:   Procedure Laterality Date    ANKLE SURGERY  08/23/2013    Ankle Surgery    COLONOSCOPY  01/04/2016    Complete Colonoscopy      Family History   Problem Relation Name Age of Onset    Diabetes Mother      Stroke Father      Hypertension Father      Hypertension Other Family History       No Known Allergies       Occupation:     Review of Systems   Constitutional:  Negative for activity change and fever.   HENT:  Negative for congestion.    Respiratory:  Negative for cough, shortness of breath and wheezing.    Cardiovascular:  Negative for chest pain and leg swelling.   Gastrointestinal:  Negative for abdominal pain, constipation, nausea and vomiting.   Endocrine: Negative for cold intolerance.   Genitourinary:  Negative for dysuria, hematuria and urgency.   Neurological:  Negative for dizziness, speech difficulty, weakness and numbness.   Psychiatric/Behavioral:  Negative for self-injury and suicidal ideas.        Objective   Visit Vitals  /80 (BP Location: Right arm, Patient Position: Sitting, BP Cuff Size: Large adult)   Pulse 83   Ht 1.702 m (5' 7\")   Wt 111 kg (244 lb)   SpO2 93%   BMI 38.22 kg/m²   Smoking Status Never   BSA 2.29 m²      Physical Exam  HENT:      Head: Normocephalic and atraumatic.      Right Ear: Tympanic membrane normal.      Left Ear: Tympanic membrane normal.      Nose: Nose normal.      Mouth/Throat:      Mouth: Mucous membranes are moist.   Eyes:    "   Extraocular Movements: Extraocular movements intact.      Conjunctiva/sclera: Conjunctivae normal.      Pupils: Pupils are equal, round, and reactive to light.   Cardiovascular:      Rate and Rhythm: Normal rate and regular rhythm.      Pulses: Normal pulses.      Heart sounds: Normal heart sounds.   Pulmonary:      Effort: Pulmonary effort is normal.      Breath sounds: Normal breath sounds. No stridor. No rhonchi.   Abdominal:      General: Bowel sounds are normal.      Palpations: Abdomen is soft.      Tenderness: There is no abdominal tenderness. There is no guarding or rebound.   Musculoskeletal:      Cervical back: Neck supple.   Neurological:      Mental Status: He is oriented to person, place, and time.   Psychiatric:         Mood and Affect: Mood normal.         Behavior: Behavior normal.       Onychomycosis      Assessment/Plan   Diagnoses and all orders for this visit:  Health care maintenance  -     Pneumococcal conjugate vaccine, 20-valent (PREVNAR 20)  Onychomycosis  -     ciclopirox (Penlac) 8 % solution; Apply to affected fingernail(s) and/or toenail(s) and adjacent skin once daily in combination with weekly nail trimming and periodic nail debridement. Remove with alcohol every 7 days; continue therapy until nail clearance (maximum duration: 48 weeks)  -     Referral to Podiatry; Future  Type 2 diabetes mellitus without complication, with long-term current use of insulin (Multi)  -     Pneumococcal conjugate vaccine, 20-valent (PREVNAR 20)  Arthralgia of left wrist  -     Uric acid; Future

## 2025-03-22 DIAGNOSIS — E78.5 HYPERLIPIDEMIA, UNSPECIFIED HYPERLIPIDEMIA TYPE: ICD-10-CM

## 2025-03-24 RX ORDER — ATORVASTATIN CALCIUM 40 MG/1
40 TABLET, FILM COATED ORAL DAILY
Qty: 90 TABLET | Refills: 0 | Status: SHIPPED | OUTPATIENT
Start: 2025-03-24

## 2025-03-29 DIAGNOSIS — I10 BENIGN ESSENTIAL HYPERTENSION: ICD-10-CM

## 2025-03-31 RX ORDER — HYDROCHLOROTHIAZIDE 25 MG/1
25 TABLET ORAL DAILY
Qty: 90 TABLET | Refills: 0 | Status: SHIPPED | OUTPATIENT
Start: 2025-03-31

## 2025-04-18 ENCOUNTER — APPOINTMENT (OUTPATIENT)
Dept: PHARMACY | Facility: HOSPITAL | Age: 60
End: 2025-04-18
Payer: COMMERCIAL

## 2025-04-18 DIAGNOSIS — N18.31 TYPE 2 DIABETES MELLITUS WITH STAGE 3A CHRONIC KIDNEY DISEASE, WITHOUT LONG-TERM CURRENT USE OF INSULIN (MULTI): ICD-10-CM

## 2025-04-18 DIAGNOSIS — E11.22 TYPE 2 DIABETES MELLITUS WITH STAGE 3A CHRONIC KIDNEY DISEASE, WITHOUT LONG-TERM CURRENT USE OF INSULIN (MULTI): ICD-10-CM

## 2025-04-18 RX ORDER — INSULIN GLARGINE 100 [IU]/ML
14 INJECTION, SOLUTION SUBCUTANEOUS DAILY
Qty: 15 ML | Refills: 1 | Status: SHIPPED | OUTPATIENT
Start: 2025-04-18

## 2025-04-18 RX ORDER — TIRZEPATIDE 12.5 MG/.5ML
12.5 INJECTION, SOLUTION SUBCUTANEOUS WEEKLY
Qty: 2 ML | Refills: 1 | Status: SHIPPED | OUTPATIENT
Start: 2025-04-18

## 2025-04-18 NOTE — PROGRESS NOTES
"Pharmacist Clinic: Diabetes Management  Babak Forte is a 60 y.o. male who presents for a follow-up evaluation of his Type 2 diabetes mellitus.   Referring Provider: Cassy Dean*     Diabetes  He presents for his follow-up diabetic visit. He has type 2 diabetes mellitus. His disease course has been improving. Associated symptoms include foot paresthesias and polyuria. There are no hypoglycemic complications. Diabetic complications include nephropathy and peripheral neuropathy. Risk factors for coronary artery disease include obesity, male sex, diabetes mellitus, dyslipidemia and hypertension. He is compliant with treatment all of the time. His weight is stable. His home blood glucose trend is decreasing steadily. An ACE inhibitor/angiotensin II receptor blocker is being taken.   Since last visit, Mounjaro maintained at 12.5mg once weekly and Lantus at 20 units daily. Home blood sugar readings have continued to trend down.   Two weeks out of Mounjaro due to supply issue at pharmacy, resumed without issue.   Lowest BG 84 during day and felt \"woozy\" ; highest 170 (while out of Mounjaro) then reduced back to usual range 100-115 when resumed.   Tolerating well denies GI side effects. No significant change in appetite or weight (reports stable at 245 lbs).   Requesting order for repeat A1c     Alcohol use: PMH binge drinking; now limiting to 2 beers on weekends    Works until 2-3 AM    No Known Allergies    LAB REVIEW   Glucose (mg/dL)   Date Value   01/07/2025 151 (H)   08/26/2024 134 (H)   05/04/2024 138 (H)     Hemoglobin A1C (%)   Date Value   01/07/2025 7.3 (H)   08/26/2024 9.8 (H)   05/04/2024 10.1 (H)     Urea Nitrogen (mg/dL)   Date Value   01/07/2025 21   08/26/2024 19   05/04/2024 34 (H)     Creatinine (mg/dL)   Date Value   01/07/2025 1.74 (H)   08/26/2024 1.76 (H)   05/04/2024 1.99 (H)     No results found for: \"ALBUR\", \"[id number]\"  Lab Results   Component Value Date    CHOL 150 01/07/2025    CHOL " 141 02/01/2024    CHOL 124 09/16/2023     Lab Results   Component Value Date    HDL 36.0 01/07/2025    HDL 27.7 02/01/2024    HDL 30.9 (A) 09/16/2023     Lab Results   Component Value Date    LDLCALC 62 01/07/2025    LDLCALC 56 02/01/2024     Lab Results   Component Value Date    TRIG 260 (H) 01/07/2025    TRIG 285 (H) 02/01/2024    TRIG 305 (H) 01/09/2023     The 10-year ASCVD risk score (Susy RICHARDSON, et al., 2019) is: 27.5%    Values used to calculate the score:      Age: 60 years      Sex: Male      Is Non- : Yes      Diabetic: Yes      Tobacco smoker: No      Systolic Blood Pressure: 135 mmHg      Is BP treated: Yes      HDL Cholesterol: 36 mg/dL      Total Cholesterol: 150 mg/dL     DIABETES ASSESSMENT  CURRENT PHARMACOTHERAPY  - Farxiga 10mg once daily (AM)  - Lantus 20 units once daily (AM)  - Mounjaro 12.5mg weekly (Sunday)    SECONDARY PREVENTION  - Statin? Yes  - ACE-I/ARB? Yes  - Aspirin? Yes    HISTORICAL PHARMACOTHERAPY  - Glyburide (weight gain, renal)  - Trulicity (N/V)  - Metformin (renal function)    SMBG MEASUREMENTS  Patient is using: Contour glucometer (declines CGM)  The patient is currently checking the blood glucose a few times per week at work  Does not check AM FBG  Postprandial 100-115  No lows  < 70; lowest 84    ADHERENCE/AFFORDABILITY  - No cost barriers  - Adherence barriers: schedule    Assessment/Plan   Problem List Items Addressed This Visit       Type 2 diabetes mellitus with stage 3a chronic kidney disease, without long-term current use of insulin (Multi)    Relevant Medications    insulin glargine (Lantus Solostar U-100 Insulin) 100 unit/mL (3 mL) pen    tirzepatide (Mounjaro) 12.5 mg/0.5 mL pen injector    Other Relevant Orders    Referral to Clinical Pharmacy    Hemoglobin A1c    Basic metabolic panel     Patients diabetes is improved with most recent A1c of 7.3% (Goal < 7%)  Continue Mounjaro 12.5mg once weekly, Farxiga 10mg once daily   Decrease Lantus to  14 units daily  Goal is to reduce insulin to discontinue as able. Discussed possible dose increase of GLP1a if needed.  Counseled on medication MOA, administration, side effects, monitoring.   Advised to test blood sugar once daily. Discussed alternating with checking in fasting state upon waking and 2 hours after eating. Record log for review at follow up.  Goal is to optimize GLP1a to reduce insulin requirements to provide consolidated regimen and metabolic benefit    Blood sugar goals  - Fastin - 130 mg/dL  - 2 hours after meal: less than 180 mg/dL  - A1c: less than 7%     PharmD Follow-up: 25 9:20 AM   PCP Follow-up: 25    Thank you,  Kassie Hammonds, PharmD    Continue all meds under the continuation of care with the referring provider and clinical pharmacy team.  Verbal consent to manage patient's drug therapy was obtained. They were informed they may decline to participate or withdraw from participation in pharmacy services at any time.

## 2025-05-02 LAB
ANION GAP SERPL CALCULATED.4IONS-SCNC: 10 MMOL/L (CALC) (ref 7–17)
BUN SERPL-MCNC: 20 MG/DL (ref 7–25)
BUN/CREAT SERPL: 12 (CALC) (ref 6–22)
CALCIUM SERPL-MCNC: 9.2 MG/DL (ref 8.6–10.3)
CHLORIDE SERPL-SCNC: 104 MMOL/L (ref 98–110)
CO2 SERPL-SCNC: 26 MMOL/L (ref 20–32)
CREAT SERPL-MCNC: 1.71 MG/DL (ref 0.7–1.35)
EGFRCR SERPLBLD CKD-EPI 2021: 45 ML/MIN/1.73M2
EST. AVERAGE GLUCOSE BLD GHB EST-MCNC: 160 MG/DL
EST. AVERAGE GLUCOSE BLD GHB EST-SCNC: 8.9 MMOL/L
GLUCOSE SERPL-MCNC: 126 MG/DL (ref 65–99)
HBA1C MFR BLD: 7.2 %
POTASSIUM SERPL-SCNC: 4.2 MMOL/L (ref 3.5–5.3)
SODIUM SERPL-SCNC: 140 MMOL/L (ref 135–146)

## 2025-05-15 NOTE — PROGRESS NOTES
Pharmacist Clinic: Diabetes Management  Babak Forte is a 60 y.o. male who presents for a follow-up evaluation of his Type 2 diabetes mellitus.   Referring Provider: Cassy Dean*     Diabetes  He presents for his follow-up diabetic visit. He has type 2 diabetes mellitus. His disease course has been improving. There are no hypoglycemic associated symptoms. Associated symptoms include foot paresthesias and polyuria. There are no hypoglycemic complications. Diabetic complications include nephropathy and peripheral neuropathy. Risk factors for coronary artery disease include obesity, male sex, diabetes mellitus, dyslipidemia and hypertension. He is compliant with treatment all of the time. His weight is decreasing steadily. There is no change in his home blood glucose trend. An ACE inhibitor/angiotensin II receptor blocker is being taken.   Since last visit, repeat A1c 7.2%, eGFR stable. Mounjaro was maintained at 12.5mg weekly and Lantus reduced from 20 units to 14 units daily due to low blood sugar symptoms. He tried one week off Lantus completely and blood sugars were 170-185 postprandial, AM . No lows < 70.   Weight reduced to 240 lbs from 244 lbs in past few weeks.  2 weeks left of 12.5mg pens.    Alcohol use: PMH binge drinking; now limiting to 2 beers on weekends    Works until 2-3 AM    No Known Allergies    LAB REVIEW   GLUCOSE (mg/dL)   Date Value   05/01/2025 126 (H)     Glucose (mg/dL)   Date Value   01/07/2025 151 (H)   08/26/2024 134 (H)   05/04/2024 138 (H)     HEMOGLOBIN A1c (%)   Date Value   05/01/2025 7.2 (H)     Hemoglobin A1C (%)   Date Value   01/07/2025 7.3 (H)   08/26/2024 9.8 (H)   05/04/2024 10.1 (H)     UREA NITROGEN (BUN) (mg/dL)   Date Value   05/01/2025 20     Urea Nitrogen (mg/dL)   Date Value   01/07/2025 21   08/26/2024 19   05/04/2024 34 (H)     Creatinine (mg/dL)   Date Value   01/07/2025 1.74 (H)   08/26/2024 1.76 (H)   05/04/2024 1.99 (H)     CREATININE (mg/dL)  "  Date Value   05/01/2025 1.71 (H)     No results found for: \"ALBUR\", \"[id number]\"  Lab Results   Component Value Date    CHOL 150 01/07/2025    CHOL 141 02/01/2024    CHOL 124 09/16/2023     Lab Results   Component Value Date    HDL 36.0 01/07/2025    HDL 27.7 02/01/2024    HDL 30.9 (A) 09/16/2023     Lab Results   Component Value Date    LDLCALC 62 01/07/2025    LDLCALC 56 02/01/2024     Lab Results   Component Value Date    TRIG 260 (H) 01/07/2025    TRIG 285 (H) 02/01/2024    TRIG 305 (H) 01/09/2023     The 10-year ASCVD risk score (Susy RICHARDSON, et al., 2019) is: 27.5%    Values used to calculate the score:      Age: 60 years      Sex: Male      Is Non- : Yes      Diabetic: Yes      Tobacco smoker: No      Systolic Blood Pressure: 135 mmHg      Is BP treated: Yes      HDL Cholesterol: 36 mg/dL      Total Cholesterol: 150 mg/dL     DIABETES ASSESSMENT  CURRENT PHARMACOTHERAPY  - Farxiga 10mg once daily (AM)  - Lantus 14 units once daily (AM)  - Mounjaro 12.5mg weekly (Sunday)    SECONDARY PREVENTION  - Statin? Yes  - ACE-I/ARB? Yes  - Aspirin? Yes    HISTORICAL PHARMACOTHERAPY  - Glyburide (weight gain, renal)  - Trulicity (N/V)  - Metformin (renal function)    SMBG MEASUREMENTS  Patient is using: Contour glucometer (declines CGM)  The patient is currently checking the blood glucose a few times per week at work  Does not check AM FBG  Postprandial 100-115  No lows  < 70  May feel hyoglycemic symptoms if < 100    ADHERENCE/AFFORDABILITY  - No cost barriers  - Adherence barriers: schedule    Assessment/Plan   Problem List Items Addressed This Visit       Type 2 diabetes mellitus with stage 3a chronic kidney disease, without long-term current use of insulin (Multi)    Relevant Medications    tirzepatide (Mounjaro) 15 mg/0.5 mL pen injector    Other Relevant Orders    Referral to Clinical Pharmacy       Patients diabetes is improved with most recent A1c of 7.2% (Goal < 7%). Home blood sugar " readings improved.  Increase Mounjaro to 15mg once weekly  Decrease Lantus to 8 units daily, if average blood sugars remain < 180 after 3 days discontinue Lantus  Continue Farxiga 10mg daily   Counseled on medication MOA, administration, side effects, monitoring.   Advised to test blood sugar once daily. Patient prefers postprandial checks due to schedule.  Goal is to optimize GLP1a to reduce insulin requirements to provide consolidated regimen and metabolic benefit    Blood sugar goals  - Fastin - 130 mg/dL  - 2 hours after meal: less than 180 mg/dL  - A1c: less than 7%     PharmD Follow-up: 25 9:20 AM   PCP Follow-up: 25    Thank you,  Kassie Hammonds, PharmD    Continue all meds under the continuation of care with the referring provider and clinical pharmacy team.  Verbal consent to manage patient's drug therapy was obtained. They were informed they may decline to participate or withdraw from participation in pharmacy services at any time.

## 2025-05-16 ENCOUNTER — APPOINTMENT (OUTPATIENT)
Dept: PHARMACY | Facility: HOSPITAL | Age: 60
End: 2025-05-16
Payer: COMMERCIAL

## 2025-05-16 DIAGNOSIS — E11.22 TYPE 2 DIABETES MELLITUS WITH STAGE 3A CHRONIC KIDNEY DISEASE, WITHOUT LONG-TERM CURRENT USE OF INSULIN (MULTI): ICD-10-CM

## 2025-05-16 DIAGNOSIS — N18.31 TYPE 2 DIABETES MELLITUS WITH STAGE 3A CHRONIC KIDNEY DISEASE, WITHOUT LONG-TERM CURRENT USE OF INSULIN (MULTI): ICD-10-CM

## 2025-05-16 RX ORDER — TIRZEPATIDE 15 MG/.5ML
15 INJECTION, SOLUTION SUBCUTANEOUS WEEKLY
Qty: 6 ML | Refills: 1 | Status: SHIPPED | OUTPATIENT
Start: 2025-05-16

## 2025-06-10 DIAGNOSIS — N18.31 TYPE 2 DIABETES MELLITUS WITH STAGE 3A CHRONIC KIDNEY DISEASE, WITHOUT LONG-TERM CURRENT USE OF INSULIN (MULTI): ICD-10-CM

## 2025-06-10 DIAGNOSIS — E11.22 TYPE 2 DIABETES MELLITUS WITH STAGE 3A CHRONIC KIDNEY DISEASE, WITHOUT LONG-TERM CURRENT USE OF INSULIN (MULTI): ICD-10-CM

## 2025-06-10 RX ORDER — TIRZEPATIDE 15 MG/.5ML
15 INJECTION, SOLUTION SUBCUTANEOUS WEEKLY
Qty: 6 ML | Refills: 1 | Status: SHIPPED | OUTPATIENT
Start: 2025-06-10

## 2025-06-12 NOTE — PROGRESS NOTES
Clinical Pharmacy Appointment    Patient ID: Babak Forte is a 60 y.o. male who presents for Diabetes.    Referring Provider: Cassy Dean  PCP: Cassy Dean MD  Last visit with PCP: 1/27/25   Next visit with PCP: 9/4/25    Subjective       Interval History  Since last visit, Mounjaro increased to 15mg weekly and Lantus   Going into 2 weeks off Mounjaro due to supply issue at local pharmacy  Increased to 20 units Lantus in meantime   Weight stable at 240 lbs , appetite is reduced     DIABETES MELLITUS TYPE 2:    Known diabetic complications: CKD, peripheral neuropathy  Does patient follow with Endocrinology: No     Current diabetic medications include:  Farxiga 10mg daily  Lantus 20 units daily   Mounjaro 15mg once weekly - missed 2 doses     Adverse Effects: None    Past diabetic medications include:  Glyburide (weight gain, CKD)  Trulicity (N/V)  Metformin (CKD)    Glucose Readings:  Glucometer Type: Contour (declines CGM)  Patient tests BG 2-3 times per week (postprandial)    Current home BG readings (mg/dL):  infrequent testing , reports no significant changes   Previous home BG readings (mg/dL): 100-115    Any episodes of hypoglycemia? No, lowest 79. Did patient treat episode of hypoglycemia appropriately? N/A  Hypoglycemia awareness? Yes , symptomatic if < 100    Lifestyle:  Diet: 2-3 meals/day. Varies with work schedule (starts work at 1 or 3PM). Denies PMH binge eating. Limits candies and sweets.  Tobacco history: None  Alcohol: 4 beers on Saturdays, 2 beers on Sundays    Secondary Prevention:  Statin? Yes  ACE-I/ARB? Yes  Aspirin? Yes    Pertinent PMH Review:  PMH of Pancreatitis: No  PMH of Retinopathy: No  PMH of Urinary Tract Infections: No  PMH of MTC: No  PMH of MEN2: No  UACR/EGFR in last year?: Yes  Albumin/Creatinine Ratio   Date Value Ref Range Status   01/07/2025 26.2 <30.0 ug/mg Creat Final   08/26/2024 11.2 <30.0 ug/mg Creat Final     Albumin/Creatine Ratio   Date  "Value Ref Range Status   01/09/2023 23.9 0.0 - 30.0 ug/mg crt Final       Medication Reconciliation:  Changed: None  Added: None  Discontinued: None    Drug Interactions  No relevant drug interactions were noted.    Medication System Management  Patient's preferred pharmacy: [facility] [place]  Adherence/Organization: No barriers  Affordability/Accessibility: No barriers; Mounjaro $25/mo with voucher     Objective   Allergies[1]  Social History     Social History Narrative    Not on file      Medication Review  Current Outpatient Medications   Medication Instructions    acetaminophen (Tylenol) 500 mg tablet 1-2 tablets, As needed    alcohol swabs (Alcohol Pads) pads, medicated Use as directed    aspirin 81 mg EC tablet 1 tablet 2 TIMES DAILY (route: oral)    atorvastatin (LIPITOR) 40 mg, oral, Daily    blood sugar diagnostic (Contour Next Test Strips) strip 1 each, miscellaneous, Daily    blood-glucose meter (Accu-Chek Guide Glucose Meter) misc Test blood sugar once daily    ciclopirox (Penlac) 8 % solution Apply to affected fingernail(s) and/or toenail(s) and adjacent skin once daily in combination with weekly nail trimming and periodic nail debridement. Remove with alcohol every 7 days; continue therapy until nail clearance (maximum duration: 48 weeks)    dapagliflozin propanediol (FARXIGA) 10 mg, oral, Every morning    diclofenac sodium (VOLTAREN) 4 g, Topical, 4 times daily    hydroCHLOROthiazide (HYDRODIURIL) 25 mg, oral, Daily    lancets misc Use as directed twice per day    Lantus Solostar U-100 Insulin 14 Units, subcutaneous, Daily    lisinopril 40 mg, oral, Daily, as directed    Mounjaro 15 mg, subcutaneous, Weekly    pen needle, diabetic 32 gauge x 1/4\" needle Use for Lantus injection once daily. Discard after use. Do not re-use pen needles.      Vitals  BP Readings from Last 2 Encounters:   01/27/25 135/80   01/07/25 165/88     BMI Readings from Last 1 Encounters:   01/27/25 38.22 kg/m²    "   Labs  Lab Results   Component Value Date    HGBA1C 7.2 (H) 05/01/2025    HGBA1C 7.3 (H) 01/07/2025    HGBA1C 9.8 (H) 08/26/2024     Lab Results   Component Value Date    CALCIUM 9.2 05/01/2025     05/01/2025    K 4.2 05/01/2025    CO2 26 05/01/2025     05/01/2025    BUN 20 05/01/2025    CREATININE 1.71 (H) 05/01/2025    EGFR 45 (L) 05/01/2025     Lab Results   Component Value Date    ALT 40 01/07/2025    AST 32 01/07/2025    ALKPHOS 73 01/07/2025    BILITOT 0.8 01/07/2025     Lab Results   Component Value Date    TRIG 260 (H) 01/07/2025    CHOL 150 01/07/2025    LDLF 65 01/09/2023    LDLCALC 62 01/07/2025    HDL 36.0 01/07/2025     Lab Results   Component Value Date    MICROALBCREA 26.2 01/07/2025     Wt Readings from Last 3 Encounters:   01/27/25 111 kg (244 lb)   01/10/25 111 kg (245 lb)   01/07/25 111 kg (245 lb)      There is no height or weight on file to calculate BMI.     Assessment/Plan   Problem List Items Addressed This Visit       Type 2 diabetes mellitus with stage 3a chronic kidney disease, without long-term current use of insulin (Multi)    Relevant Orders    Referral to Clinical Pharmacy     Patient's goal A1c is < 7%.  Is pt at goal? No, 7.2%    Medication Changes:  Increase Mounjaro to 15mg once weekly  In stock at  pharmacy so will fill today   Decrease Lantus to 8 units daily, if average blood sugars remain < 180 after 3 days discontinue Lantus  Continue Farxiga 10mg daily     Advised to test blood sugar once daily. Patient prefers postprandial checks due to work schedule.  Goal is to optimize GLP1a to reduce insulin requirements to provide consolidated regimen and metabolic benefit    Monitoring and Education:  Counseled patient on MOA, expectations, side effects, duration of therapy, administration, and monitoring parameters.  Addressed all of patients questions and concerns at time of appointment. Encouraged to reach out to PharmD with additional needs.    Blood sugar goals  -  Fastin - 130 mg/dL  - 2 hours after meal: less than 180 mg/dL  - A1c: less than 7%     Clinical Pharmacist follow-up:  09:40 AM , Telehealth visit    Continue all meds under the continuation of care with the referring provider and clinical pharmacy team.    Thank you,  Kassie Hammonds, PharmD  Clinical Pharmacy Specialist Primary Care  [phone]     Verbal consent to manage patient's drug therapy was obtained from the patient. They were informed they may decline to participate or withdraw from participation in pharmacy services at any time.          [1] No Known Allergies

## 2025-06-13 ENCOUNTER — APPOINTMENT (OUTPATIENT)
Dept: PHARMACY | Facility: HOSPITAL | Age: 60
End: 2025-06-13
Payer: COMMERCIAL

## 2025-06-13 DIAGNOSIS — N18.31 TYPE 2 DIABETES MELLITUS WITH STAGE 3A CHRONIC KIDNEY DISEASE, WITHOUT LONG-TERM CURRENT USE OF INSULIN (MULTI): ICD-10-CM

## 2025-06-13 DIAGNOSIS — E11.22 TYPE 2 DIABETES MELLITUS WITH STAGE 3A CHRONIC KIDNEY DISEASE, WITHOUT LONG-TERM CURRENT USE OF INSULIN (MULTI): ICD-10-CM

## 2025-06-13 PROCEDURE — RXMED WILLOW AMBULATORY MEDICATION CHARGE

## 2025-06-18 ENCOUNTER — PHARMACY VISIT (OUTPATIENT)
Dept: PHARMACY | Facility: CLINIC | Age: 60
End: 2025-06-18
Payer: COMMERCIAL

## 2025-06-27 ENCOUNTER — APPOINTMENT (OUTPATIENT)
Dept: PHARMACY | Facility: HOSPITAL | Age: 60
End: 2025-06-27
Payer: COMMERCIAL

## 2025-06-27 DIAGNOSIS — N18.31 TYPE 2 DIABETES MELLITUS WITH STAGE 3A CHRONIC KIDNEY DISEASE, WITHOUT LONG-TERM CURRENT USE OF INSULIN (MULTI): ICD-10-CM

## 2025-06-27 DIAGNOSIS — E11.22 TYPE 2 DIABETES MELLITUS WITH STAGE 3A CHRONIC KIDNEY DISEASE, WITHOUT LONG-TERM CURRENT USE OF INSULIN (MULTI): ICD-10-CM

## 2025-06-27 RX ORDER — TIRZEPATIDE 10 MG/.5ML
10 INJECTION, SOLUTION SUBCUTANEOUS WEEKLY
Qty: 2 ML | Refills: 0 | Status: SHIPPED | OUTPATIENT
Start: 2025-06-27

## 2025-06-27 RX ORDER — TIRZEPATIDE 12.5 MG/.5ML
12.5 INJECTION, SOLUTION SUBCUTANEOUS WEEKLY
Qty: 2 ML | Refills: 0 | Status: SHIPPED | OUTPATIENT
Start: 2025-06-27 | End: 2025-06-27 | Stop reason: DRUGHIGH

## 2025-06-27 NOTE — PROGRESS NOTES
Clinical Pharmacy Appointment    Patient ID: Babak Forte is a 60 y.o. male who presents for Diabetes.    Referring Provider: Cassy Dean  PCP: Cassy Dean MD  Last visit with PCP: 1/27/25   Next visit with PCP: 9/4/25    Subjective       Interval History  Since last visit, resumed Mounjaro after 2 missed doses (stock issue at local pharmacy)   Diarrhea and dry heaves for 2 days after resuming. No GI side effects since then.   Maintained at 20 units Lantus   Has not checked sugars in a few weeks   Weight stable at 240 lbs , appetite is reduced     DIABETES MELLITUS TYPE 2:    Known diabetic complications: CKD, peripheral neuropathy  Does patient follow with Endocrinology: No     Current diabetic medications include:  Farxiga 10mg daily  Lantus 20 units daily   Mounjaro 15mg once weekly     Adverse Effects: None    Past diabetic medications include:  Glyburide (weight gain, CKD)  Trulicity (N/V)  Metformin (CKD)    Glucose Readings:  Glucometer Type: Contour (declines CGM)  Patient tests BG 2-3 times per week (postprandial)    Current home BG readings (mg/dL):  infrequent testing , reports no significant changes   Previous home BG readings (mg/dL): 100-115    Any episodes of hypoglycemia? No, lowest 79. Did patient treat episode of hypoglycemia appropriately? N/A  Hypoglycemia awareness? Yes , symptomatic if < 100    Lifestyle:  Diet: 2-3 meals/day. Varies with work schedule (starts work at 1 or 3PM). Denies PMH binge eating. Limits candies and sweets.  Tobacco history: None  Alcohol: 4 beers on Saturdays, 2 beers on Sundays    Secondary Prevention:  Statin? Yes  ACE-I/ARB? Yes  Aspirin? Yes    Pertinent PMH Review:  PMH of Pancreatitis: No  PMH of Retinopathy: No  PMH of Urinary Tract Infections: No  PMH of MTC: No  PMH of MEN2: No  UACR/EGFR in last year?: Yes  Albumin/Creatinine Ratio   Date Value Ref Range Status   01/07/2025 26.2 <30.0 ug/mg Creat Final   08/26/2024 11.2 <30.0  "ug/mg Creat Final     Albumin/Creatine Ratio   Date Value Ref Range Status   01/09/2023 23.9 0.0 - 30.0 ug/mg crt Final       Medication Reconciliation:  Changed: None  Added: None  Discontinued: None    Drug Interactions  No relevant drug interactions were noted.    Medication System Management  Patient's preferred pharmacy: [facility]  Adherence/Organization: No barriers  Affordability/Accessibility: No barriers; Mounjaro $25/mo with voucher     Objective   Allergies[1]  Social History     Social History Narrative    Not on file      Medication Review  Current Outpatient Medications   Medication Instructions    acetaminophen (Tylenol) 500 mg tablet 1-2 tablets, As needed    alcohol swabs (Alcohol Pads) pads, medicated Use as directed    aspirin 81 mg EC tablet 1 tablet 2 TIMES DAILY (route: oral)    atorvastatin (LIPITOR) 40 mg, oral, Daily    blood sugar diagnostic (Contour Next Test Strips) strip 1 each, miscellaneous, Daily    blood-glucose meter (Accu-Chek Guide Glucose Meter) misc Test blood sugar once daily    ciclopirox (Penlac) 8 % solution Apply to affected fingernail(s) and/or toenail(s) and adjacent skin once daily in combination with weekly nail trimming and periodic nail debridement. Remove with alcohol every 7 days; continue therapy until nail clearance (maximum duration: 48 weeks)    dapagliflozin propanediol (FARXIGA) 10 mg, oral, Every morning    diclofenac sodium (VOLTAREN) 4 g, Topical, 4 times daily    hydroCHLOROthiazide (HYDRODIURIL) 25 mg, oral, Daily    lancets misc Use as directed twice per day    Lantus Solostar U-100 Insulin 14 Units, subcutaneous, Daily    lisinopril 40 mg, oral, Daily, as directed    Mounjaro 15 mg, subcutaneous, Weekly    pen needle, diabetic 32 gauge x 1/4\" needle Use for Lantus injection once daily. Discard after use. Do not re-use pen needles.      Vitals  BP Readings from Last 2 Encounters:   01/27/25 135/80   01/07/25 165/88     BMI Readings from " Last 1 Encounters:   25 38.22 kg/m²      Labs  Lab Results   Component Value Date    HGBA1C 7.2 (H) 2025    HGBA1C 7.3 (H) 2025    HGBA1C 9.8 (H) 2024     Lab Results   Component Value Date    CALCIUM 9.2 2025     2025    K 4.2 2025    CO2 26 2025     2025    BUN 20 2025    CREATININE 1.71 (H) 2025    EGFR 45 (L) 2025     Lab Results   Component Value Date    ALT 40 2025    AST 32 2025    ALKPHOS 73 2025    BILITOT 0.8 2025     Lab Results   Component Value Date    TRIG 260 (H) 2025    CHOL 150 2025    LDLF 65 2023    LDLCALC 62 2025    HDL 36.0 2025     Lab Results   Component Value Date    MICROALBCREA 26.2 2025     Wt Readings from Last 3 Encounters:   25 111 kg (244 lb)   01/10/25 111 kg (245 lb)   25 111 kg (245 lb)      There is no height or weight on file to calculate BMI.     Assessment/Plan   Problem List Items Addressed This Visit       Type 2 diabetes mellitus with stage 3a chronic kidney disease, without long-term current use of insulin (Multi)       Patient's goal A1c is < 7%.  Is pt at goal? No, 7.2%    Medication Changes:  Decrease Mounjaro to 12.5mg once weekly due to GI s/e with resuming at higher dosing  Requests [facility] mail delivery due to supply issues at local pharmacy  Continue Farxiga 10mg daily, Lantus 20 units daily    Advised to test blood sugar once daily. Patient prefers postprandial checks due to work schedule.  Goal is to optimize GLP1a to reduce insulin requirements to provide consolidated regimen and metabolic benefit    Monitoring and Education:  Counseled patient on MOA, expectations, side effects, duration of therapy, administration, and monitoring parameters.  Addressed all of patients questions and concerns at time of appointment. Encouraged to reach out to PharmD with additional needs.    Blood sugar goals  - Fastin - 130  mg/dL  - 2 hours after meal: less than 180 mg/dL  - A1c: less than 7%     Clinical Pharmacist follow-up: 7/18 09:40 AM , Telehealth visit    Continue all meds under the continuation of care with the referring provider and clinical pharmacy team.    Thank you,  Kassie Hammonds, PharmD  Clinical Pharmacy Specialist Primary Care  [phone]     Verbal consent to manage patient's drug therapy was obtained from the patient. They were informed they may decline to participate or withdraw from participation in pharmacy services at any time.          [1] No Known Allergies

## 2025-06-27 NOTE — PROGRESS NOTES
Unfortunately insurance will not pay for Mounjaro refill until 7/9/25 despite GI side effects on 15mg. Spoke with patient, does not wish to re-trial 15mg at this time so will remain on Lantus 20 units daily for now and resume Mounjaro at lower dosing 10 mg in 2 weeks.

## 2025-07-09 ENCOUNTER — PHARMACY VISIT (OUTPATIENT)
Dept: PHARMACY | Facility: CLINIC | Age: 60
End: 2025-07-09
Payer: COMMERCIAL

## 2025-07-09 PROCEDURE — RXMED WILLOW AMBULATORY MEDICATION CHARGE

## 2025-07-18 ENCOUNTER — APPOINTMENT (OUTPATIENT)
Dept: PHARMACY | Facility: HOSPITAL | Age: 60
End: 2025-07-18
Payer: COMMERCIAL

## 2025-07-18 DIAGNOSIS — I10 BENIGN ESSENTIAL HYPERTENSION: ICD-10-CM

## 2025-07-18 DIAGNOSIS — E78.5 HYPERLIPIDEMIA, UNSPECIFIED HYPERLIPIDEMIA TYPE: ICD-10-CM

## 2025-07-18 DIAGNOSIS — N18.31 TYPE 2 DIABETES MELLITUS WITH STAGE 3A CHRONIC KIDNEY DISEASE, WITHOUT LONG-TERM CURRENT USE OF INSULIN (MULTI): ICD-10-CM

## 2025-07-18 DIAGNOSIS — E11.22 TYPE 2 DIABETES MELLITUS WITH STAGE 3A CHRONIC KIDNEY DISEASE, WITHOUT LONG-TERM CURRENT USE OF INSULIN (MULTI): ICD-10-CM

## 2025-07-18 RX ORDER — ATORVASTATIN CALCIUM 40 MG/1
40 TABLET, FILM COATED ORAL DAILY
Qty: 90 TABLET | Refills: 0 | Status: SHIPPED | OUTPATIENT
Start: 2025-07-18

## 2025-07-18 RX ORDER — DAPAGLIFLOZIN 10 MG/1
10 TABLET, FILM COATED ORAL EVERY MORNING
Qty: 90 TABLET | Refills: 3 | Status: SHIPPED | OUTPATIENT
Start: 2025-07-18

## 2025-07-18 RX ORDER — HYDROCHLOROTHIAZIDE 25 MG/1
25 TABLET ORAL DAILY
Qty: 90 TABLET | Refills: 0 | Status: SHIPPED | OUTPATIENT
Start: 2025-07-18

## 2025-07-18 RX ORDER — LISINOPRIL 40 MG/1
40 TABLET ORAL DAILY
Qty: 90 TABLET | Refills: 0 | Status: SHIPPED | OUTPATIENT
Start: 2025-07-18

## 2025-07-18 RX ORDER — INSULIN GLARGINE 100 [IU]/ML
30 INJECTION, SOLUTION SUBCUTANEOUS DAILY
Qty: 30 ML | Refills: 1 | Status: SHIPPED | OUTPATIENT
Start: 2025-07-18

## 2025-07-18 NOTE — PROGRESS NOTES
Clinical Pharmacy Appointment    Patient ID: Babak Forte is a 60 y.o. male who presents for Diabetes.    Referring Provider: Cassy Dean  PCP: Cassy Dean MD  Last visit with PCP: 1/27/25   Next visit with PCP: 8/19/25 with Mable Manuel    Subjective       Interval History  Since last visit, resumed Mounjaro at reduced dosing 10mg weekly due to GI side effects at 15mg dose  Developed nausea, vomiting and diarrhea day after injection lasting 24 hours    DIABETES MELLITUS TYPE 2:    Known diabetic complications: CKD, peripheral neuropathy  Does patient follow with Endocrinology: No     Current diabetic medications include:  Farxiga 10mg daily  Lantus 20 units daily   Mounjaro 10mg once weekly - last dose given 7/13    Adverse Effects: None    Past diabetic medications include:  Glyburide (weight gain, CKD)  Trulicity (N/V)  Metformin (CKD)    Glucose Readings:  Glucometer Type: Contour (declines CGM)  Patient tests BG 2-3 times per week (postprandial)    Current home BG readings (mg/dL):  Highest 250 (while off Mounjaro), yesterday 165  Previous home BG readings (mg/dL): 100-115    Any episodes of hypoglycemia? No, lowest 79. Did patient treat episode of hypoglycemia appropriately? N/A  Hypoglycemia awareness? Yes , symptomatic if < 100    Lifestyle:  Diet: 2-3 meals/day. Varies with work schedule (starts work at 1 or 3PM). Denies PMH binge eating. Limits candies and sweets.  Tobacco history: None  Alcohol: 4 beers on Saturdays, 2 beers on Sundays    Secondary Prevention:  Statin? Yes  ACE-I/ARB? Yes  Aspirin? Yes    Pertinent PMH Review:  PMH of Pancreatitis: No  PMH of Retinopathy: No  PMH of Urinary Tract Infections: No  PMH of MTC: No  PMH of MEN2: No  UACR/EGFR in last year?: Yes  Albumin/Creatinine Ratio   Date Value Ref Range Status   01/07/2025 26.2 <30.0 ug/mg Creat Final   08/26/2024 11.2 <30.0 ug/mg Creat Final     Albumin/Creatine Ratio   Date Value Ref Range Status  "  01/09/2023 23.9 0.0 - 30.0 ug/mg crt Final       Medication Reconciliation:  Changed: None  Added: None  Discontinued: None    Drug Interactions  No relevant drug interactions were noted.    Medication System Management  Patient's preferred pharmacy: [facility]  Adherence/Organization: No barriers  Affordability/Accessibility: No barriers; Mounjaro $25/mo with voucher     Objective   Allergies[1]  Social History     Social History Narrative    Not on file      Medication Review  Current Outpatient Medications   Medication Instructions    acetaminophen (Tylenol) 500 mg tablet 1-2 tablets, As needed    alcohol swabs (Alcohol Pads) pads, medicated Use as directed    aspirin 81 mg EC tablet 1 tablet 2 TIMES DAILY (route: oral)    atorvastatin (LIPITOR) 40 mg, oral, Daily    blood sugar diagnostic (Contour Next Test Strips) strip 1 each, miscellaneous, Daily    blood-glucose meter (Accu-Chek Guide Glucose Meter) misc Test blood sugar once daily    ciclopirox (Penlac) 8 % solution Apply to affected fingernail(s) and/or toenail(s) and adjacent skin once daily in combination with weekly nail trimming and periodic nail debridement. Remove with alcohol every 7 days; continue therapy until nail clearance (maximum duration: 48 weeks)    dapagliflozin propanediol (FARXIGA) 10 mg, oral, Every morning    diclofenac sodium (VOLTAREN) 4 g, Topical, 4 times daily    hydroCHLOROthiazide (HYDRODIURIL) 25 mg, oral, Daily    lancets misc Use as directed twice per day    Lantus Solostar U-100 Insulin 30 Units, subcutaneous, Daily, Increase by 2 units every 4 days for blood sugar over 130 up to max of 50 units daily.    lisinopril 40 mg, oral, Daily, as directed    pen needle, diabetic 32 gauge x 1/4\" needle Use for Lantus injection once daily. Discard after use. Do not re-use pen needles.      Vitals  BP Readings from Last 2 Encounters:   01/27/25 135/80   01/07/25 165/88     BMI Readings from Last 1 Encounters:   01/27/25 " 38.22 kg/m²      Labs  Lab Results   Component Value Date    HGBA1C 7.2 (H) 05/01/2025    HGBA1C 7.3 (H) 01/07/2025    HGBA1C 9.8 (H) 08/26/2024     Lab Results   Component Value Date    CALCIUM 9.2 05/01/2025     05/01/2025    K 4.2 05/01/2025    CO2 26 05/01/2025     05/01/2025    BUN 20 05/01/2025    CREATININE 1.71 (H) 05/01/2025    EGFR 45 (L) 05/01/2025     Lab Results   Component Value Date    ALT 40 01/07/2025    AST 32 01/07/2025    ALKPHOS 73 01/07/2025    BILITOT 0.8 01/07/2025     Lab Results   Component Value Date    TRIG 260 (H) 01/07/2025    CHOL 150 01/07/2025    LDLF 65 01/09/2023    LDLCALC 62 01/07/2025    HDL 36.0 01/07/2025     Lab Results   Component Value Date    MICROALBCREA 26.2 01/07/2025     Wt Readings from Last 3 Encounters:   01/27/25 111 kg (244 lb)   01/10/25 111 kg (245 lb)   01/07/25 111 kg (245 lb)      There is no height or weight on file to calculate BMI.     Assessment/Plan   Problem List Items Addressed This Visit       Benign essential hypertension    Relevant Medications    lisinopril 40 mg tablet    hydroCHLOROthiazide (HYDRODiuril) 25 mg tablet    Type 2 diabetes mellitus with stage 3a chronic kidney disease, without long-term current use of insulin (Multi)    Relevant Medications    lisinopril 40 mg tablet    hydroCHLOROthiazide (HYDRODiuril) 25 mg tablet    dapagliflozin propanediol (Farxiga) 10 mg tablet    atorvastatin (Lipitor) 40 mg tablet    insulin glargine (Lantus Solostar U-100 Insulin) 100 unit/mL (3 mL) pen    Other Relevant Orders    Referral to Clinical Pharmacy    Hyperlipidemia    Relevant Medications    atorvastatin (Lipitor) 40 mg tablet         Patient's goal A1c is < 7%.  Is pt at goal? No, 7.2%    Medication Changes:  Discontinue Mounjaro due to persistent GI side effects   Increase Lantus to 30 units daily  Continue Farxiga 10mg daily    Advised to test blood sugar once daily. Patient prefers postprandial checks due to work schedule.  Once  side effects completely resolve may consider resuming at lowest dose 2.5mg weekly and re-titrating pending tolerance. Previously tolerated up to 12.5mg weekly however GI side effects developed when there was a stock issue and resumed after being off for a few weeks.    Monitoring and Education:  Counseled patient on MOA, expectations, side effects, duration of therapy, administration, and monitoring parameters.  Addressed all of patients questions and concerns at time of appointment. Encouraged to reach out to PharmD with additional needs.    Blood sugar goals  - Fastin - 130 mg/dL  - 2 hours after meal: less than 180 mg/dL  - A1c: less than 7%     Clinical Pharmacist follow-up: 3 weeks, Telehealth visit    Continue all meds under the continuation of care with the referring provider and clinical pharmacy team.    Thank you,  Kassie Hammonds, PharmD  Clinical Pharmacy Specialist Primary Care  [phone]     Verbal consent to manage patient's drug therapy was obtained from the patient. They were informed they may decline to participate or withdraw from participation in pharmacy services at any time.          [1] No Known Allergies

## 2025-08-05 ENCOUNTER — TELEPHONE (OUTPATIENT)
Dept: PHARMACY | Facility: HOSPITAL | Age: 60
End: 2025-08-05
Payer: COMMERCIAL

## 2025-08-05 NOTE — TELEPHONE ENCOUNTER
Patient called to report increased blood sugars and polyuria since stopping Mounjaro. Last visit increased Lantus to 30 units daily. Reports AVG -300.   Advised to increase Lantus to 40 units daily.    Pharmacy follow up 8/8/25 for BG review and dose adjustment.

## 2025-08-08 ENCOUNTER — APPOINTMENT (OUTPATIENT)
Dept: PHARMACY | Facility: HOSPITAL | Age: 60
End: 2025-08-08
Payer: COMMERCIAL

## 2025-08-08 DIAGNOSIS — E11.22 TYPE 2 DIABETES MELLITUS WITH STAGE 3A CHRONIC KIDNEY DISEASE, WITHOUT LONG-TERM CURRENT USE OF INSULIN (MULTI): Primary | ICD-10-CM

## 2025-08-08 DIAGNOSIS — N18.31 TYPE 2 DIABETES MELLITUS WITH STAGE 3A CHRONIC KIDNEY DISEASE, WITHOUT LONG-TERM CURRENT USE OF INSULIN (MULTI): Primary | ICD-10-CM

## 2025-08-08 PROCEDURE — RXMED WILLOW AMBULATORY MEDICATION CHARGE

## 2025-08-08 RX ORDER — TIRZEPATIDE 2.5 MG/.5ML
2.5 INJECTION, SOLUTION SUBCUTANEOUS WEEKLY
Qty: 2 ML | Refills: 0 | Status: SHIPPED | OUTPATIENT
Start: 2025-08-08

## 2025-08-08 NOTE — PROGRESS NOTES
Clinical Pharmacy Appointment    Patient ID: Babak Forte is a 60 y.o. male who presents for Diabetes.    Referring Provider: Cassy Dean  PCP: Cassy Dean MD  Last visit with PCP: 1/27/25   Next visit with PCP: 8/19/25 with Mable Manuel    Subjective       Interval History  Since last visit, remained off Mounjaro for ~ 1 month due to persistent GI side effects. Lantus increased to 40 units in meantime. Notes more frequent urination. -->250    DIABETES MELLITUS TYPE 2:    Known diabetic complications: CKD, peripheral neuropathy  Does patient follow with Endocrinology: No     Current diabetic medications include:  Farxiga 10mg daily  Lantus 40 units daily     Adverse Effects: None    Past diabetic medications include:  Glyburide (weight gain, CKD)  Trulicity (N/V)  Metformin (CKD)    Glucose Readings:  Glucometer Type: Contour (declines CGM)  Patient tests BG 2-3 times per week (postprandial)    Current home BG readings (mg/dL):  250s  Previous home BG readings (mg/dL): 300-350    Any episodes of hypoglycemia? No, lowest 79. Did patient treat episode of hypoglycemia appropriately? N/A  Hypoglycemia awareness? Yes , symptomatic if < 100    Lifestyle:  Diet: 2-3 meals/day. Varies with work schedule (starts work at 1 or 3PM). Denies PMH binge eating. Limits candies and sweets.  Tobacco history: None  Alcohol: 4 beers on Saturdays, 2 beers on Sundays    Secondary Prevention:  Statin? Yes  ACE-I/ARB? Yes  Aspirin? Yes    Pertinent PMH Review:  PMH of Pancreatitis: No  PMH of Retinopathy: No  PMH of Urinary Tract Infections: No  PMH of MTC: No  PMH of MEN2: No  UACR/EGFR in last year?: Yes  Albumin/Creatinine Ratio   Date Value Ref Range Status   01/07/2025 26.2 <30.0 ug/mg Creat Final   08/26/2024 11.2 <30.0 ug/mg Creat Final     Albumin/Creatine Ratio   Date Value Ref Range Status   01/09/2023 23.9 0.0 - 30.0 ug/mg crt Final       Medication Reconciliation:  Changed: None  Added:  "None  Discontinued: None    Drug Interactions  No relevant drug interactions were noted.    Medication System Management  Patient's preferred pharmacy: [facility],  Mail for Mounjaro   Adherence/Organization: No barriers  Affordability/Accessibility: No barriers; Mounjaro $25/mo with voucher     Objective   Allergies[1]  Social History     Social History Narrative    Not on file      Medication Review  Current Outpatient Medications   Medication Instructions    acetaminophen (Tylenol) 500 mg tablet 1-2 tablets, As needed    alcohol swabs (Alcohol Pads) pads, medicated Use as directed    aspirin 81 mg EC tablet 1 tablet 2 TIMES DAILY (route: oral)    atorvastatin (LIPITOR) 40 mg, oral, Daily    blood sugar diagnostic (Contour Next Test Strips) strip 1 each, miscellaneous, Daily    blood-glucose meter (Accu-Chek Guide Glucose Meter) misc Test blood sugar once daily    ciclopirox (Penlac) 8 % solution Apply to affected fingernail(s) and/or toenail(s) and adjacent skin once daily in combination with weekly nail trimming and periodic nail debridement. Remove with alcohol every 7 days; continue therapy until nail clearance (maximum duration: 48 weeks)    dapagliflozin propanediol (FARXIGA) 10 mg, oral, Every morning    diclofenac sodium (VOLTAREN) 4 g, Topical, 4 times daily    hydroCHLOROthiazide (HYDRODIURIL) 25 mg, oral, Daily    lancets misc Use as directed twice per day    Lantus Solostar U-100 Insulin 30 Units, subcutaneous, Daily, Increase by 2 units every 4 days for blood sugar over 130 up to max of 50 units daily.    lisinopril 40 mg, oral, Daily, as directed    Mounjaro 2.5 mg, subcutaneous, Weekly    pen needle, diabetic 32 gauge x 1/4\" needle Use for Lantus injection once daily. Discard after use. Do not re-use pen needles.      Vitals  BP Readings from Last 2 Encounters:   01/27/25 135/80   01/07/25 165/88     BMI Readings from Last 1 Encounters:   01/27/25 38.22 kg/m²      Labs  Lab Results "   Component Value Date    HGBA1C 7.2 (H) 05/01/2025    HGBA1C 7.3 (H) 01/07/2025    HGBA1C 9.8 (H) 08/26/2024     Lab Results   Component Value Date    CALCIUM 9.2 05/01/2025     05/01/2025    K 4.2 05/01/2025    CO2 26 05/01/2025     05/01/2025    BUN 20 05/01/2025    CREATININE 1.71 (H) 05/01/2025    EGFR 45 (L) 05/01/2025     Lab Results   Component Value Date    ALT 40 01/07/2025    AST 32 01/07/2025    ALKPHOS 73 01/07/2025    BILITOT 0.8 01/07/2025     Lab Results   Component Value Date    TRIG 260 (H) 01/07/2025    CHOL 150 01/07/2025    LDLF 65 01/09/2023    LDLCALC 62 01/07/2025    HDL 36.0 01/07/2025     Lab Results   Component Value Date    MICROALBCREA 26.2 01/07/2025     Wt Readings from Last 3 Encounters:   01/27/25 111 kg (244 lb)   01/10/25 111 kg (245 lb)   01/07/25 111 kg (245 lb)      There is no height or weight on file to calculate BMI.     Assessment/Plan   Problem List Items Addressed This Visit       Type 2 diabetes mellitus with stage 3a chronic kidney disease, without long-term current use of insulin (Multi) - Primary    Relevant Medications    tirzepatide (Mounjaro) 2.5 mg/0.5 mL pen injector    Other Relevant Orders    Referral to Clinical Pharmacy       Patient's goal A1c is < 7%.  Is pt at goal? No, 7.2%  Previously tolerated up to 12.5mg weekly however GI side effects developed when there was a stock issue and resumed after being off for a few weeks. Now that GI side effects have resolved and blood sugars are trending up, patient agreeable to resume Mounjaro and titrate monthly.     Medication Changes:  Initiate Mounjaro 2.5mg subcutaneous once weekly  Increase Lantus to 46 units daily  Continue Farxiga 10mg daily      Monitoring and Education:  Counseled patient on MOA, expectations, side effects, duration of therapy, administration, and monitoring parameters.  Addressed all of patients questions and concerns at time of appointment. Encouraged to reach out to PharmD with  additional needs.    Blood sugar goals  - Fastin - 130 mg/dL  - 2 hours after meal: less than 180 mg/dL  - A1c: less than 7%     Clinical Pharmacist follow-up: 4 weeks, Telehealth visit    Continue all meds under the continuation of care with the referring provider and clinical pharmacy team.    Thank you,  Kassie Hammonds, PharmD  Clinical Pharmacy Specialist Primary Care  [phone]     Verbal consent to manage patient's drug therapy was obtained from the patient. They were informed they may decline to participate or withdraw from participation in pharmacy services at any time.          [1] No Known Allergies

## 2025-08-11 ENCOUNTER — PHARMACY VISIT (OUTPATIENT)
Dept: PHARMACY | Facility: CLINIC | Age: 60
End: 2025-08-11
Payer: COMMERCIAL

## 2025-08-19 ENCOUNTER — APPOINTMENT (OUTPATIENT)
Dept: PRIMARY CARE | Facility: CLINIC | Age: 60
End: 2025-08-19
Payer: COMMERCIAL

## 2025-09-04 ENCOUNTER — APPOINTMENT (OUTPATIENT)
Dept: PRIMARY CARE | Facility: CLINIC | Age: 60
End: 2025-09-04
Payer: COMMERCIAL

## 2025-09-05 ENCOUNTER — APPOINTMENT (OUTPATIENT)
Dept: PHARMACY | Facility: HOSPITAL | Age: 60
End: 2025-09-05
Payer: COMMERCIAL

## 2025-09-05 ENCOUNTER — PHARMACY VISIT (OUTPATIENT)
Dept: PHARMACY | Facility: CLINIC | Age: 60
End: 2025-09-05
Payer: COMMERCIAL

## 2025-09-05 PROCEDURE — RXMED WILLOW AMBULATORY MEDICATION CHARGE

## 2025-09-12 ENCOUNTER — APPOINTMENT (OUTPATIENT)
Dept: PRIMARY CARE | Facility: CLINIC | Age: 60
End: 2025-09-12
Payer: COMMERCIAL

## 2025-10-03 ENCOUNTER — APPOINTMENT (OUTPATIENT)
Dept: PHARMACY | Facility: HOSPITAL | Age: 60
End: 2025-10-03
Payer: COMMERCIAL

## 2026-01-20 ENCOUNTER — APPOINTMENT (OUTPATIENT)
Dept: PRIMARY CARE | Facility: CLINIC | Age: 61
End: 2026-01-20
Payer: COMMERCIAL

## 2026-01-27 ENCOUNTER — APPOINTMENT (OUTPATIENT)
Dept: PRIMARY CARE | Facility: CLINIC | Age: 61
End: 2026-01-27
Payer: COMMERCIAL